# Patient Record
Sex: FEMALE | Race: BLACK OR AFRICAN AMERICAN | Employment: OTHER | URBAN - NONMETROPOLITAN AREA
[De-identification: names, ages, dates, MRNs, and addresses within clinical notes are randomized per-mention and may not be internally consistent; named-entity substitution may affect disease eponyms.]

---

## 2017-03-14 ENCOUNTER — TELEPHONE (OUTPATIENT)
Dept: PULMONOLOGY | Age: 72
End: 2017-03-14

## 2017-06-16 ENCOUNTER — TELEPHONE (OUTPATIENT)
Dept: PULMONOLOGY | Age: 72
End: 2017-06-16

## 2017-07-19 ENCOUNTER — OFFICE VISIT (OUTPATIENT)
Dept: PULMONOLOGY | Age: 72
End: 2017-07-19
Payer: COMMERCIAL

## 2017-07-19 VITALS
OXYGEN SATURATION: 97 % | BODY MASS INDEX: 36.5 KG/M2 | SYSTOLIC BLOOD PRESSURE: 124 MMHG | WEIGHT: 206 LBS | HEIGHT: 63 IN | DIASTOLIC BLOOD PRESSURE: 74 MMHG | RESPIRATION RATE: 16 BRPM | HEART RATE: 80 BPM

## 2017-07-19 DIAGNOSIS — G47.33 OSA (OBSTRUCTIVE SLEEP APNEA): Primary | ICD-10-CM

## 2017-07-19 PROCEDURE — 99213 OFFICE O/P EST LOW 20 MIN: CPT | Performed by: INTERNAL MEDICINE

## 2017-08-09 ENCOUNTER — HOSPITAL ENCOUNTER (OUTPATIENT)
Dept: AUDIOLOGY | Age: 72
Discharge: HOME | End: 2017-08-09
Payer: COMMERCIAL

## 2017-08-09 PROCEDURE — 92538 CALORIC VSTBLR TEST W/REC: CPT | Performed by: AUDIOLOGIST

## 2017-08-09 PROCEDURE — 92540 BASIC VESTIBULAR EVALUATION: CPT | Performed by: AUDIOLOGIST

## 2017-08-09 PROCEDURE — 92567 TYMPANOMETRY: CPT | Performed by: AUDIOLOGIST

## 2017-11-16 ENCOUNTER — HOSPITAL ENCOUNTER (OUTPATIENT)
Dept: WOMENS IMAGING | Age: 72
Discharge: HOME | End: 2017-11-16
Payer: COMMERCIAL

## 2017-11-16 DIAGNOSIS — Z12.31 VISIT FOR SCREENING MAMMOGRAM: ICD-10-CM

## 2017-11-16 PROCEDURE — 77063 BREAST TOMOSYNTHESIS BI: CPT

## 2018-03-07 ENCOUNTER — OFFICE VISIT (OUTPATIENT)
Dept: PULMONOLOGY | Age: 73
End: 2018-03-07
Payer: MEDICARE

## 2018-03-07 VITALS
DIASTOLIC BLOOD PRESSURE: 78 MMHG | HEART RATE: 67 BPM | WEIGHT: 210.2 LBS | HEIGHT: 67 IN | RESPIRATION RATE: 16 BRPM | SYSTOLIC BLOOD PRESSURE: 118 MMHG | BODY MASS INDEX: 32.99 KG/M2 | OXYGEN SATURATION: 97 %

## 2018-03-07 DIAGNOSIS — G47.33 OBSTRUCTIVE SLEEP APNEA: Primary | ICD-10-CM

## 2018-03-07 PROCEDURE — 99213 OFFICE O/P EST LOW 20 MIN: CPT | Performed by: PHYSICIAN ASSISTANT

## 2018-03-07 ASSESSMENT — ENCOUNTER SYMPTOMS
SHORTNESS OF BREATH: 0
SORE THROAT: 0
HEARTBURN: 0
ORTHOPNEA: 0
GASTROINTESTINAL NEGATIVE: 1
RESPIRATORY NEGATIVE: 1
COUGH: 0
SINUS PAIN: 0
NAUSEA: 0
WHEEZING: 0
SPUTUM PRODUCTION: 0
EYES NEGATIVE: 1

## 2018-04-21 ENCOUNTER — NURSE TRIAGE (OUTPATIENT)
Dept: ADMINISTRATIVE | Age: 73
End: 2018-04-21

## 2018-04-21 ENCOUNTER — HOSPITAL ENCOUNTER (EMERGENCY)
Age: 73
Discharge: HOME | End: 2018-04-21
Payer: MEDICARE

## 2018-04-21 ENCOUNTER — HOSPITAL ENCOUNTER (EMERGENCY)
Dept: GENERAL RADIOLOGY | Age: 73
Discharge: HOME | End: 2018-04-21
Payer: MEDICARE

## 2018-04-21 VITALS
DIASTOLIC BLOOD PRESSURE: 68 MMHG | OXYGEN SATURATION: 95 % | HEART RATE: 75 BPM | BODY MASS INDEX: 36.32 KG/M2 | HEIGHT: 63 IN | RESPIRATION RATE: 18 BRPM | SYSTOLIC BLOOD PRESSURE: 126 MMHG | TEMPERATURE: 99 F | WEIGHT: 205 LBS

## 2018-04-21 DIAGNOSIS — J06.9 ACUTE UPPER RESPIRATORY INFECTION: Primary | ICD-10-CM

## 2018-04-21 PROCEDURE — 99213 OFFICE O/P EST LOW 20 MIN: CPT | Performed by: NURSE PRACTITIONER

## 2018-04-21 PROCEDURE — 71046 X-RAY EXAM CHEST 2 VIEWS: CPT

## 2018-04-21 PROCEDURE — 99214 OFFICE O/P EST MOD 30 MIN: CPT

## 2018-04-21 RX ORDER — DOXYCYCLINE HYCLATE 100 MG/1
100 CAPSULE ORAL 2 TIMES DAILY
Qty: 20 CAPSULE | Refills: 0 | Status: SHIPPED | OUTPATIENT
Start: 2018-04-21 | End: 2018-05-01

## 2018-04-21 RX ORDER — BENZONATATE 200 MG/1
200 CAPSULE ORAL 3 TIMES DAILY PRN
Qty: 21 CAPSULE | Refills: 0 | Status: SHIPPED | OUTPATIENT
Start: 2018-04-21 | End: 2018-04-28

## 2018-04-21 RX ORDER — GUAIFENESIN 600 MG/1
1200 TABLET, EXTENDED RELEASE ORAL 2 TIMES DAILY
Qty: 40 TABLET | Refills: 0 | Status: SHIPPED | OUTPATIENT
Start: 2018-04-21 | End: 2018-05-01

## 2018-04-21 ASSESSMENT — ENCOUNTER SYMPTOMS
COLOR CHANGE: 0
WHEEZING: 0
COUGH: 1
VOMITING: 0
DIARRHEA: 0
RHINORRHEA: 0
SINUS PAIN: 0
SINUS PRESSURE: 1
NAUSEA: 0
TROUBLE SWALLOWING: 0
CHEST TIGHTNESS: 0
SORE THROAT: 1
SHORTNESS OF BREATH: 1

## 2018-04-27 DIAGNOSIS — J02.9 ACUTE PHARYNGITIS, UNSPECIFIED ETIOLOGY: ICD-10-CM

## 2018-04-27 DIAGNOSIS — J40 BRONCHITIS: ICD-10-CM

## 2018-09-26 NOTE — TELEPHONE ENCOUNTER
Lolis L Tyson called requesting a refill on the following medications:  Requested Prescriptions     Pending Prescriptions Disp Refills    tiotropium (SPIRIVA) 18 MCG inhalation capsule 30 capsule 3     Sig: Inhale 1 capsule into the lungs daily     Pharmacy verified:  [facility]      Date of last visit:  3/7/2018  Date of next visit (if applicable): 1/0/9394

## 2019-02-28 ENCOUNTER — HOSPITAL ENCOUNTER (OUTPATIENT)
Dept: WOMENS IMAGING | Age: 74
Discharge: HOME | End: 2019-02-28
Payer: MEDICARE

## 2019-02-28 DIAGNOSIS — Z12.31 VISIT FOR SCREENING MAMMOGRAM: ICD-10-CM

## 2019-02-28 PROCEDURE — 77063 BREAST TOMOSYNTHESIS BI: CPT

## 2019-09-12 ENCOUNTER — TELEPHONE (OUTPATIENT)
Dept: PULMONOLOGY | Age: 74
End: 2019-09-12

## 2019-09-13 NOTE — TELEPHONE ENCOUNTER
I will forward this message to Ms. Nora Hernandez. Patient is currently following with Ms. Valentina Lamb. Please see her last follow up note.

## 2019-09-16 ENCOUNTER — TELEPHONE (OUTPATIENT)
Dept: PULMONOLOGY | Age: 74
End: 2019-09-16

## 2020-01-28 NOTE — PROGRESS NOTES
Constitutional: Negative for chills and fever. Dry mouth   HENT: Negative. Negative for congestion, nosebleeds, sinus pain and sore throat. Eyes: Negative. Respiratory: Negative. Negative for cough, sputum production, shortness of breath and wheezing. Cardiovascular: Negative. Negative for chest pain, orthopnea and PND. Gastrointestinal: Negative. Negative for heartburn and nausea. Genitourinary: Negative. Musculoskeletal: Negative. Negative for joint pain and myalgias. Skin: Negative. Neurological: Negative. Negative for dizziness, weakness and headaches. Endo/Heme/Allergies: Negative. Psychiatric/Behavioral: Negative. Negative for depression. The patient is not nervous/anxious and does not have insomnia. All other systems reviewed and are negative. Physical Exam:    BMI:  Body mass index is 33.17 kg/m². Wt Readings from Last 3 Encounters:   03/07/18 210 lb 3.2 oz (95.3 kg)   07/19/17 206 lb (93.4 kg)   11/18/16 202 lb (91.6 kg)     Vitals: /78 (Site: Left Arm, Position: Sitting, Cuff Size: Large Adult)   Pulse 67   Resp 16   Ht 5' 6.75\" (1.695 m)   Wt 210 lb 3.2 oz (95.3 kg)   SpO2 97% Comment: on RA  BMI 33.17 kg/m²       Physical Exam   Constitutional: She is oriented to person, place, and time and well-developed, well-nourished, and in no distress. No distress. HENT:   Head: Normocephalic and atraumatic. Mouth/Throat: Oropharynx is clear and moist. No oropharyngeal exudate. Eyes: Pupils are equal, round, and reactive to light. Neck: Normal range of motion. Neck supple. Cardiovascular: Normal rate, regular rhythm and normal heart sounds. Pulmonary/Chest: Effort normal and breath sounds normal. No stridor. No respiratory distress. Abdominal: Soft. Bowel sounds are normal.   Musculoskeletal: Normal range of motion. She exhibits no edema. Neurological: She is alert and oriented to person, place, and time.  Gait normal.   Skin: Skin is yes

## 2020-02-12 ENCOUNTER — OFFICE VISIT (OUTPATIENT)
Dept: PULMONOLOGY | Age: 75
End: 2020-02-12
Payer: MEDICARE

## 2020-02-12 VITALS
BODY MASS INDEX: 36.89 KG/M2 | SYSTOLIC BLOOD PRESSURE: 102 MMHG | DIASTOLIC BLOOD PRESSURE: 70 MMHG | OXYGEN SATURATION: 98 % | HEIGHT: 63 IN | HEART RATE: 68 BPM | WEIGHT: 208.2 LBS

## 2020-02-12 PROCEDURE — G8427 DOCREV CUR MEDS BY ELIG CLIN: HCPCS | Performed by: PHYSICIAN ASSISTANT

## 2020-02-12 PROCEDURE — 1036F TOBACCO NON-USER: CPT | Performed by: PHYSICIAN ASSISTANT

## 2020-02-12 PROCEDURE — 99214 OFFICE O/P EST MOD 30 MIN: CPT | Performed by: PHYSICIAN ASSISTANT

## 2020-02-12 PROCEDURE — 1123F ACP DISCUSS/DSCN MKR DOCD: CPT | Performed by: PHYSICIAN ASSISTANT

## 2020-02-12 PROCEDURE — G8484 FLU IMMUNIZE NO ADMIN: HCPCS | Performed by: PHYSICIAN ASSISTANT

## 2020-02-12 PROCEDURE — G8417 CALC BMI ABV UP PARAM F/U: HCPCS | Performed by: PHYSICIAN ASSISTANT

## 2020-02-12 PROCEDURE — 1090F PRES/ABSN URINE INCON ASSESS: CPT | Performed by: PHYSICIAN ASSISTANT

## 2020-02-12 PROCEDURE — G8400 PT W/DXA NO RESULTS DOC: HCPCS | Performed by: PHYSICIAN ASSISTANT

## 2020-02-12 PROCEDURE — 4040F PNEUMOC VAC/ADMIN/RCVD: CPT | Performed by: PHYSICIAN ASSISTANT

## 2020-02-12 PROCEDURE — 3017F COLORECTAL CA SCREEN DOC REV: CPT | Performed by: PHYSICIAN ASSISTANT

## 2020-02-12 RX ORDER — HYDROCODONE BITARTRATE AND ACETAMINOPHEN 5; 325 MG/1; MG/1
TABLET ORAL
COMMUNITY
Start: 2020-01-23

## 2020-02-12 RX ORDER — TRIAMTERENE AND HYDROCHLOROTHIAZIDE 37.5; 25 MG/1; MG/1
TABLET ORAL
COMMUNITY
Start: 2020-01-19

## 2020-02-12 ASSESSMENT — ENCOUNTER SYMPTOMS
BACK PAIN: 0
STRIDOR: 0
CHEST TIGHTNESS: 0
ALLERGIC/IMMUNOLOGIC NEGATIVE: 1
SHORTNESS OF BREATH: 0
NAUSEA: 0
EYES NEGATIVE: 1
DIARRHEA: 0
WHEEZING: 0
COUGH: 0

## 2020-02-12 NOTE — PROGRESS NOTES
[Naproxen Sodium] Other (See Comments)     Upsets stomach    Lisinopril Other (See Comments)     Cough        Current Outpatient Medications   Medication Sig Dispense Refill    triamterene-hydrochlorothiazide (MAXZIDE-25) 37.5-25 MG per tablet TK 1 T PO QD IN THE MORNING      HYDROcodone-acetaminophen (NORCO) 5-325 MG per tablet TK 1 T PO  Q 8 H  PRN      albuterol-ipratropium (COMBIVENT RESPIMAT)  MCG/ACT AERS inhaler Inhale 1 puff into the lungs every 6 hours 1 Inhaler 11    UNABLE TO FIND amitiza      Ascorbic Acid (VITAMIN C PO) Take by mouth      Cetirizine HCl (ZYRTEC ALLERGY PO) Take by mouth      albuterol sulfate  (90 BASE) MCG/ACT inhaler Inhale 2 puffs into the lungs every 6 hours as needed for Wheezing 1 Inhaler 6    acetaminophen 650 MG TABS Take 650 mg by mouth every 4 hours as needed 120 tablet 3    levothyroxine (SYNTHROID) 25 MCG tablet TAKE 1 TABLET BY MOUTH DAILY 30 tablet 6    pantoprazole (PROTONIX) 40 MG tablet TAKE 1 TABLET BY MOUTH DAILY. 30 tablet 6    celecoxib (CELEBREX) 200 MG capsule Take 1 capsule by mouth 2 times daily. 60 capsule 6    Misc Natural Products (AM/PM MENOPAUSE FORMULA PO) Take by mouth      CPAP Machine MISC by Does not apply route Please change her Auto CPAP pressure to a fixed CPAP pressure of 13 cm H20. (Patient not taking: Reported on 2/12/2020) 1 each 0     No current facility-administered medications for this visit. Family History   Problem Relation Age of Onset    Diabetes Mother     High Blood Pressure Mother     High Blood Pressure Father     Diabetes Father     Stroke Sister     Diabetes Sister     High Blood Pressure Sister     Diabetes Brother     Cancer Brother         Review of Systems -   Review of Systems   Constitutional: Negative for activity change, appetite change, chills and fever. HENT: Negative for congestion and postnasal drip. Eyes: Negative.     Respiratory: Negative for cough, chest tightness, 30-39.9)     3. Hypersomnia              Plan   Do you need any equipment today? Yes update supplies  - She needs to get new supplies so she can start wearing it again  - she needs to to improve compliance to improve hypersomnia  - She  was advised to continue current positive airway pressure therapy with above described pressure. - She  advised to keep good compliance with current recommended pressure to get optimal results and clinical improvement  - Recommend 7-9 hours of sleep with PAP  - She was advised to call [employer] company regarding supplies if needed.   -She call my office for earlier appointment if needed for worsening of sleep symptoms.   - She was instructed on weight loss  - Sara Zurita was educated about my impression and plan. Patient verbalizesunderstanding.   We will see Fifi Macdonald back in: 6 months with download    Information added by my medical assistant/LPN was reviewed today         Álvaro Ware PA-C, MPAS  2/12/2020

## 2020-02-13 ENCOUNTER — TELEPHONE (OUTPATIENT)
Dept: PULMONOLOGY | Age: 75
End: 2020-02-13

## 2020-03-02 ENCOUNTER — HOSPITAL ENCOUNTER (OUTPATIENT)
Dept: WOMENS IMAGING | Age: 75
Discharge: HOME | End: 2020-03-02
Payer: MEDICARE

## 2020-03-02 PROCEDURE — 77067 SCR MAMMO BI INCL CAD: CPT

## 2020-03-04 ENCOUNTER — TELEPHONE (OUTPATIENT)
Dept: PULMONOLOGY | Age: 75
End: 2020-03-04

## 2020-09-17 ENCOUNTER — OFFICE VISIT (OUTPATIENT)
Dept: PULMONOLOGY | Age: 75
End: 2020-09-17
Payer: MEDICARE

## 2020-09-17 VITALS
DIASTOLIC BLOOD PRESSURE: 88 MMHG | HEART RATE: 74 BPM | WEIGHT: 215 LBS | OXYGEN SATURATION: 99 % | HEIGHT: 63 IN | SYSTOLIC BLOOD PRESSURE: 132 MMHG | TEMPERATURE: 98.9 F | BODY MASS INDEX: 38.09 KG/M2

## 2020-09-17 PROCEDURE — 1123F ACP DISCUSS/DSCN MKR DOCD: CPT | Performed by: NURSE PRACTITIONER

## 2020-09-17 PROCEDURE — 1090F PRES/ABSN URINE INCON ASSESS: CPT | Performed by: NURSE PRACTITIONER

## 2020-09-17 PROCEDURE — 99213 OFFICE O/P EST LOW 20 MIN: CPT | Performed by: NURSE PRACTITIONER

## 2020-09-17 PROCEDURE — 1036F TOBACCO NON-USER: CPT | Performed by: NURSE PRACTITIONER

## 2020-09-17 PROCEDURE — G8427 DOCREV CUR MEDS BY ELIG CLIN: HCPCS | Performed by: NURSE PRACTITIONER

## 2020-09-17 PROCEDURE — G8400 PT W/DXA NO RESULTS DOC: HCPCS | Performed by: NURSE PRACTITIONER

## 2020-09-17 PROCEDURE — 4040F PNEUMOC VAC/ADMIN/RCVD: CPT | Performed by: NURSE PRACTITIONER

## 2020-09-17 PROCEDURE — 3017F COLORECTAL CA SCREEN DOC REV: CPT | Performed by: NURSE PRACTITIONER

## 2020-09-17 PROCEDURE — G8417 CALC BMI ABV UP PARAM F/U: HCPCS | Performed by: NURSE PRACTITIONER

## 2020-09-17 ASSESSMENT — ENCOUNTER SYMPTOMS
COUGH: 0
WHEEZING: 0
STRIDOR: 0
DIARRHEA: 0
VOMITING: 0
CHEST TIGHTNESS: 0
NAUSEA: 0
SHORTNESS OF BREATH: 0

## 2020-09-17 NOTE — PROGRESS NOTES
HYSTERECTOMY      partial    JOINT REPLACEMENT      Lt total knee    VOCAL CORD AUGMENTATION W/RADIESSE INJ  3-       Social History     Tobacco Use    Smoking status: Former Smoker     Packs/day: 0.75     Years: 30.00     Pack years: 22.50     Types: Cigarettes     Start date:      Last attempt to quit: 10/31/2010     Years since quittin.8    Smokeless tobacco: Never Used   Substance Use Topics    Alcohol use: Yes     Comment: social    Drug use: No       Allergies   Allergen Reactions    Aleve [Naproxen Sodium] Other (See Comments)     Upsets stomach    Lisinopril Other (See Comments)     Cough        Current Outpatient Medications   Medication Sig Dispense Refill    HYDROcodone-acetaminophen (NORCO) 5-325 MG per tablet TK 1 T PO  Q 8 H  PRN      UNABLE TO FIND amitiza      Misc Natural Products (AM/PM MENOPAUSE FORMULA PO) Take by mouth      Ascorbic Acid (VITAMIN C PO) Take by mouth      Cetirizine HCl (ZYRTEC ALLERGY PO) Take by mouth      acetaminophen 650 MG TABS Take 650 mg by mouth every 4 hours as needed 120 tablet 3    levothyroxine (SYNTHROID) 25 MCG tablet TAKE 1 TABLET BY MOUTH DAILY 30 tablet 6    pantoprazole (PROTONIX) 40 MG tablet TAKE 1 TABLET BY MOUTH DAILY. 30 tablet 6    triamterene-hydrochlorothiazide (MAXZIDE-25) 37.5-25 MG per tablet TK 1 T PO QD IN THE MORNING       No current facility-administered medications for this visit. Family History   Problem Relation Age of Onset    Diabetes Mother     High Blood Pressure Mother     High Blood Pressure Father     Diabetes Father     Stroke Sister     Diabetes Sister     High Blood Pressure Sister     Diabetes Brother     Cancer Brother         Review of Systems -   Review of Systems   Constitutional: Negative for chills, fever and unexpected weight change. Respiratory: Negative for cough, chest tightness, shortness of breath, wheezing and stridor.     Cardiovascular: Negative for chest pain and leg swelling. Gastrointestinal: Negative for diarrhea, nausea and vomiting. Genitourinary: Negative for dysuria. Physical Exam:    BMI:  Body mass index is 38.09 kg/m². Wt Readings from Last 3 Encounters:   09/17/20 215 lb (97.5 kg)   02/12/20 208 lb 3.2 oz (94.4 kg)   04/21/18 205 lb (93 kg)     Weight stable / unchanged  Vitals: /88 (Site: Left Upper Arm, Position: Sitting, Cuff Size: Large Adult)   Pulse 74   Temp 98.9 °F (37.2 °C) (Temporal)   Ht 5' 3\" (1.6 m)   Wt 215 lb (97.5 kg)   SpO2 99% Comment: r/a  BMI 38.09 kg/m²       Physical Exam  Vitals signs and nursing note reviewed. Constitutional:       General: She is not in acute distress. Appearance: She is well-developed. She is obese. Comments: BMI 38   HENT:      Head: Normocephalic and atraumatic. Neck:      Musculoskeletal: Neck supple. Trachea: No tracheal deviation. Cardiovascular:      Rate and Rhythm: Normal rate and regular rhythm. Heart sounds: Normal heart sounds. No murmur. Pulmonary:      Effort: Pulmonary effort is normal. No respiratory distress. Breath sounds: Normal breath sounds. No stridor. No wheezing or rales. Chest:      Chest wall: No tenderness. Abdominal:      General: Bowel sounds are normal.      Palpations: Abdomen is soft. Skin:     General: Skin is warm and dry. Capillary Refill: Capillary refill takes less than 2 seconds. Neurological:      Mental Status: She is alert and oriented to person, place, and time. Psychiatric:         Behavior: Behavior normal.         Thought Content: Thought content normal.           ASSESSMENT/DIAGNOSIS     Diagnosis Orders   1. Obstructive sleep apnea  DME Order for CPAP as OP   2. Obesity (BMI 30-39.9)     3. Hypersomnia              Plan   Do you need any equipment today? Yes new Mask       - She  was advised to resume current positive airway pressure therapy with previous pressure.  As soon as mask is available  - She  advised to keep good compliance with current recommended pressure to get optimal results and clinical improvement  - Recommend 7-9 hours of sleep with PAP  - She was advised to call [employer] company regarding supplies if needed.   -She is to call my office for earlier appointment if needed for worsening of sleep symptoms.   - She was instructed on weight loss  - was educated about my impression and plan. Patient verbalizes understanding.    -Patient was inquiring about having her COPD managed through our office will need separate appointment for new Pulmonary consult with Dr. Sony Vance saw patient at [facility] several years ago  -Will request records from [facility] for any Pulmonary testing ie. spirometry.     We will see Rose Mary Walkerks back in: 1 ,year with download     Information added by my medical assistant/LPN was reviewed today     Electronically signed by ROSELIA Agustin CNP on 9/17/20 at 9:46 AM EDT

## 2020-10-04 NOTE — PROGRESS NOTES
[facility] [address]  Pulmonary medicine clinic initial consult note Vish Rodriguez is an established patient of my sleep medicine clinic at [facility]. She came for Pulmonary medicine evaluation today. Patient: Wil Fagan  :       Chief complaint/[place]: Wil Fagan is a 77 y.o. old female came for further evaluation regarding her with referral from***. Social History:  Occupation:  She is current working: {YES/NO:}  Type of profession: {Occupation:12970}. History of tobacco smoking:{YES/NO:}. Amount of tobacco smoking: {NUMBERS; 0.25-3 BY 0.25:40081} PPD. Years of tobacco smoking: {Numbers; 0-100:00847}. Quit smoking: {YES/NO:}. .              Quit year:{YES/NO:}. Current smoker: {YES/NO:}. Amount of current tobacco smoking: {NUMBERS; 0.25-3 BY 2.34:51167} PPD    History of recreational or IV drug use in the past:NO     History of exposure to coal mines/coal dust: NO  History of exposure to foundry dust/welding: NO  History of exposure to quarry/silica/sandblasting: NO  History of exposure to asbestos/working with breaks/ships: NO  History of exposure to farm dust: NO  History of recent travel to long distances: NO  History of exposure to birds, pigeons, or chickens in the past:NO  Pet animals at home:{YES/NO:}  Dogs: {NUMBERS 0-10:48785}  Cats: {NUMBERS 0-10:}    History of pulmonary embolism in the past: {YES/NO:}            History of DVT in the past:{YES/NO:}                             Review of Systems:   General/Constitutional: No recent loss of weight or appetite changes. No fever or chills. HENT: Negative. Eyes: Negative. Upper respiratory tract: No nasal stuffiness or post nasal drip. Lower respiratory tract/ lungs: No cough or sputum production. No hemoptysis.   Cardiovascular: No palpitations or chest  High Blood Pressure Father     Diabetes Father     Stroke Sister     Diabetes Sister     High Blood Pressure Sister     Diabetes Brother     Cancer Brother            Physical Exam:    VITALS:  There were no vitals taken for this visit. Nursing note and vitals reviewed. Constitutional: Patient appears moderately built and moderately nourished. No distress. Patient is oriented to person, place, and time. HENT:   Head: Normocephalic and atraumatic. Right Ear: External ear normal.   Left Ear: External ear normal.   Mouth/Throat: Oropharynx is clear and moist.  No oral thrush. Eyes: Conjunctivae are normal. Pupils are equal, round, and reactive to light. No scleral icterus. Neck: Neck supple. No JVD present. No tracheal deviation present. Cardiovascular: Normal rate, regular rhythm, normal heart sounds. No murmur heard. Pulmonary/Chest: Effort normal and breath sounds normal. No stridor. No respiratory distress. No wheezes. No rales. Patient exhibits no tenderness. Abdominal: Soft. Patient exhibits no distension. No tenderness. Musculoskeletal: Normal range of motion. Extremities: Patient exhibits no edema and no tenderness. Lymphadenopathy:  No cervical adenopathy. Neurological: Patient is alert and oriented to person, place, and time. Skin: Skin is warm and dry. Patient is not diaphoretic. Psychiatric: Patient  has a normal mood and affect. Patient behavior is normal.     Mallampati airway Class:{Ethan # I-V:30229}  Neck Circumference:{NUMBERS 0-31:95172}. {NUMBERS; 0-9:18627} Inches    Diagnostic Data:    Radiological Data:      Pulmonary function tests:        Assessment:  - Mild obstructive sleep apnea on treatment with Auto CPAP Level  Min Pressure 6cmH2O and maximum pressure of  20 cmH2O- under good control with current therapy with significant improvement in clinical symptoms.  - Inadequate sleep hygiene-improved. Periodic limb movements with no significant arousals.   -

## 2020-10-27 ENCOUNTER — OFFICE VISIT (OUTPATIENT)
Dept: PULMONOLOGY | Age: 75
End: 2020-10-27
Payer: MEDICARE

## 2020-10-27 ENCOUNTER — HOSPITAL ENCOUNTER (OUTPATIENT)
Dept: GENERAL RADIOLOGY | Age: 75
Discharge: HOME | End: 2020-10-27
Payer: MEDICARE

## 2020-10-27 VITALS
WEIGHT: 207.8 LBS | DIASTOLIC BLOOD PRESSURE: 88 MMHG | TEMPERATURE: 98.7 F | SYSTOLIC BLOOD PRESSURE: 136 MMHG | HEIGHT: 63 IN | HEART RATE: 93 BPM | BODY MASS INDEX: 36.82 KG/M2 | OXYGEN SATURATION: 98 %

## 2020-10-27 PROBLEM — R06.02 SOB (SHORTNESS OF BREATH) ON EXERTION: Status: ACTIVE | Noted: 2020-10-27

## 2020-10-27 PROBLEM — Z87.09 HISTORY OF COPD: Status: ACTIVE | Noted: 2020-10-27

## 2020-10-27 PROBLEM — R06.09 DYSPNEA ON EXERTION: Status: ACTIVE | Noted: 2020-10-27

## 2020-10-27 PROCEDURE — G8417 CALC BMI ABV UP PARAM F/U: HCPCS | Performed by: INTERNAL MEDICINE

## 2020-10-27 PROCEDURE — 3209999900 XR COMPARISON OF OUTSIDE FILMS

## 2020-10-27 PROCEDURE — G8400 PT W/DXA NO RESULTS DOC: HCPCS | Performed by: INTERNAL MEDICINE

## 2020-10-27 PROCEDURE — 1123F ACP DISCUSS/DSCN MKR DOCD: CPT | Performed by: INTERNAL MEDICINE

## 2020-10-27 PROCEDURE — G8427 DOCREV CUR MEDS BY ELIG CLIN: HCPCS | Performed by: INTERNAL MEDICINE

## 2020-10-27 PROCEDURE — 1036F TOBACCO NON-USER: CPT | Performed by: INTERNAL MEDICINE

## 2020-10-27 PROCEDURE — 4040F PNEUMOC VAC/ADMIN/RCVD: CPT | Performed by: INTERNAL MEDICINE

## 2020-10-27 PROCEDURE — 99214 OFFICE O/P EST MOD 30 MIN: CPT | Performed by: INTERNAL MEDICINE

## 2020-10-27 PROCEDURE — 3017F COLORECTAL CA SCREEN DOC REV: CPT | Performed by: INTERNAL MEDICINE

## 2020-10-27 PROCEDURE — 1090F PRES/ABSN URINE INCON ASSESS: CPT | Performed by: INTERNAL MEDICINE

## 2020-10-27 PROCEDURE — G8484 FLU IMMUNIZE NO ADMIN: HCPCS | Performed by: INTERNAL MEDICINE

## 2020-10-27 RX ORDER — LUBIPROSTONE 24 UG/1
CAPSULE, GELATIN COATED ORAL
COMMUNITY
Start: 2020-09-24

## 2020-10-27 RX ORDER — FLUTICASONE PROPIONATE 50 MCG
1 SPRAY, SUSPENSION (ML) NASAL DAILY
Qty: 1 BOTTLE | Refills: 3 | Status: SHIPPED | OUTPATIENT
Start: 2020-10-27

## 2020-10-27 RX ORDER — LOSARTAN POTASSIUM 25 MG/1
TABLET ORAL
COMMUNITY
Start: 2020-09-09 | End: 2020-11-16 | Stop reason: ALTCHOICE

## 2020-10-27 NOTE — PROGRESS NOTES
[facility]  Pulmonary medicine clinic initial consult note. Patient: Lina Calles  :       Chief complaint/[place]: Lina Calles is a 77 y.o. old female came for further evaluation regarding her COPD and shortness of breath. This patient is established at our sleep clinic for ORESTES on CPAP, presents to the office for continued management of her COPD previously seen at [facility]. Endorses not using the CPAP because the mouth piece burns the nose, also d/t cough. Patient reports something is stuck in throat, feeling as if she can't clear her throat entirely. Per pt, barium swallow in 2020, no findings. Proventil no longer helpful. She is having shortness of breath: Yes exertional  Onset: gradual   Duration:6months. Diurnal variation:  None. Functional status prior to beginning of symptoms: 2 block/s on level ground. - also knee pain  Current functional capacity on level ground: became SOB walking from parking lot to office  She can climb steps: Yes  Flights of steps she can climb: 2  She denies orthopnea. She denies paroxysmal nocturnal dyspnea. She is having cough: Yes  Duration of cough: for 1 month changed BP med at that time. Her cough is associated with sputum production: Yes   The sputum color: clear, occasionally green   Hemoptysis:No  Diurnal variation: None. Relieving factors: Yes - cough syrup  Aggravating factors: Yes - talking    She is having chest pain:No    She is currently not using any oxygen supplementation at rest, exercise or during sleep/at night time. She was never diagnosed with pulmonary diseases I.e bronchial asthma, COPD,Pulmonary fibrosis, Sarcoidosis, pulmonary embolism,pulmonary hypertension or pleural effusion/s in the past.  She denies any hemoptysis. She was never diagnosed with pulmonary tuberculosis in the past. She denies any recent exposure to any patients with tuberculosis.  She denies any recent travel to endemic places of tuberculosis. She  denies to TB exposure in the past.  She denies any history of exposure to patients with tuberculosis in the past. She denies any recent travel history to endemic places to tuberculosis. Social History:  Occupation:  She is current working: No  Type of profession: retired. - [employer]                        History of tobacco smoking:Yes  Amount of tobacco smokin.0 PPD. Years of tobacco smokin. Quit smoking: Yes. Quit OEDE:6851   . History of recreational or IV drug use in the past:NO     History of exposure to coal mines/coal dust: NO  History of exposure to foundry dust/welding: NO  History of exposure to quarry/silica/sandblasting: NO  History of exposure to asbestos/working with breaks/ships: NO  History of exposure to farm dust: NO  History of recent travel to long distances: NO  History of exposure to birds, pigeons, or chickens in the past:NO  Pet animals at home:No  Had a dog before. History of pulmonary embolism in the past: No            History of DVT in the past:No                             Review of Systems:   General/Constitutional: No recent loss of weight or appetite changes. No fever or chills. HENT: Negative. Eyes: Negative. Upper respiratory tract: No nasal stuffiness or post nasal drip. Lower respiratory tract/ lungs: No cough or sputum production. No hemoptysis. Cardiovascular: No palpitations or chest pain. Gastrointestinal: No nausea or vomiting. Neurological: No focal neurologiacal weakness. Extremities: No edema. Musculoskeletal: No complaints. Genitourinary: No complaints. Hematological: Negative. Psychiatric/Behavioral: Negative. Skin: No itching.       Current Medications:          Past Medical History:   Diagnosis Date    COPD (chronic obstructive pulmonary disease) (HCC)     DJD (degenerative joint disease)     GERD (gastroesophageal reflux disease) the chest.        Pulmonary function tests:  [facility] 3-4 years ago        Assessment:  -Hx of COPD of uncertain severity. - still waiting for PFT records, their system is currently down  -Mild obstructive sleep apnea on treatment with auto CPAP level minimum pressure 6 cmH2O and maximum pressure 20 cmH2O under good control with current therapy with significant improvement in clinical symptoms.  -Inadequate sleep hygiene -improved  Periodic limb movements with no significant arousals.  -Hypertension  -Obesity  -WELL's score = 3 / Unlikely    Recommendations/Plan:  Office follow up in 1 month  -Low dose CT screening  -PFT scheduled  - Referral to Dr Ti Zurita for globus pharyngeus evaluation    Addendum by Dr. Flori Gambino MD:  I have seen and examined the patient independently. Face to face evaluation and examination was performed. The above evaluation and note has been reviewed. Labs and radiographs were reviewed. I have discussed with Dr.Vitaliy Jie MD about this patient in detail. The above assessment and plan has been reviewed. Please see my modifications mentioned below. My modifications:  She is an established patient of my sleep medicine clinic at [facility]. She came for Pulmonary medicine evaluation today. Her old pulmonary function test from [facility] was not available for review. Will follow patient in 1 month with a low-dose CT scan of chest along with a full pulmonary function test to evaluate for COPD. She is currently following with the [facility]. She was seen by Mr. Kaleigh Gupta CNP on 17 September 2020 for the last time.   Patient educated about my impression and plan      Governor Kirstie MD 10/27/2020 7:15 PM

## 2020-10-27 NOTE — PATIENT INSTRUCTIONS
- follow up in 1 month after pulmonary function test  - COVID testing before pulmonary function test  - please get CT chest before next visit

## 2020-10-27 NOTE — PROGRESS NOTES
Neck Circumference -   16.5  Mallampati - 4    Lung Nodule Screening     [] Qualifies    [] Does not qualify   [] Declined    [] Completed

## 2020-11-03 ENCOUNTER — OFFICE VISIT (OUTPATIENT)
Dept: ENT CLINIC | Age: 75
End: 2020-11-03
Payer: MEDICARE

## 2020-11-03 VITALS
WEIGHT: 208.8 LBS | HEIGHT: 63 IN | HEART RATE: 80 BPM | BODY MASS INDEX: 37 KG/M2 | DIASTOLIC BLOOD PRESSURE: 70 MMHG | RESPIRATION RATE: 14 BRPM | TEMPERATURE: 98.1 F | SYSTOLIC BLOOD PRESSURE: 118 MMHG

## 2020-11-03 PROCEDURE — 4040F PNEUMOC VAC/ADMIN/RCVD: CPT | Performed by: OTOLARYNGOLOGY

## 2020-11-03 PROCEDURE — G8400 PT W/DXA NO RESULTS DOC: HCPCS | Performed by: OTOLARYNGOLOGY

## 2020-11-03 PROCEDURE — 99203 OFFICE O/P NEW LOW 30 MIN: CPT | Performed by: OTOLARYNGOLOGY

## 2020-11-03 PROCEDURE — 1036F TOBACCO NON-USER: CPT | Performed by: OTOLARYNGOLOGY

## 2020-11-03 PROCEDURE — 1090F PRES/ABSN URINE INCON ASSESS: CPT | Performed by: OTOLARYNGOLOGY

## 2020-11-03 PROCEDURE — G8484 FLU IMMUNIZE NO ADMIN: HCPCS | Performed by: OTOLARYNGOLOGY

## 2020-11-03 PROCEDURE — G8417 CALC BMI ABV UP PARAM F/U: HCPCS | Performed by: OTOLARYNGOLOGY

## 2020-11-03 PROCEDURE — 1123F ACP DISCUSS/DSCN MKR DOCD: CPT | Performed by: OTOLARYNGOLOGY

## 2020-11-03 PROCEDURE — 31575 DIAGNOSTIC LARYNGOSCOPY: CPT | Performed by: OTOLARYNGOLOGY

## 2020-11-03 PROCEDURE — G8427 DOCREV CUR MEDS BY ELIG CLIN: HCPCS | Performed by: OTOLARYNGOLOGY

## 2020-11-03 PROCEDURE — 3017F COLORECTAL CA SCREEN DOC REV: CPT | Performed by: OTOLARYNGOLOGY

## 2020-11-03 RX ORDER — AMLODIPINE BESYLATE 2.5 MG/1
2.5 TABLET ORAL DAILY
COMMUNITY

## 2020-11-03 ASSESSMENT — ENCOUNTER SYMPTOMS
WHEEZING: 0
ABDOMINAL PAIN: 0
APNEA: 0
COUGH: 0
CHEST TIGHTNESS: 0
COLOR CHANGE: 0
FACIAL SWELLING: 0
RHINORRHEA: 0
SHORTNESS OF BREATH: 0
TROUBLE SWALLOWING: 0
SORE THROAT: 0
NAUSEA: 0
VOICE CHANGE: 0
VOMITING: 0
SINUS PRESSURE: 0
STRIDOR: 0
CHOKING: 0
DIARRHEA: 0

## 2020-11-03 NOTE — PROGRESS NOTES
[facility] PROFESSIONAL SERVS  [facility]  [facility] [place]  Dept: [phone]  Dept Fax: [phone]  Loc: [phone]    James Diaz is a 77 y.o. female who was referred Gwen Cooper MD for:  Chief Complaint   Patient presents with    Other     New patient is here for globus pharyngeus ref by Dr. Shiva Garnica. c/o shotting pain ear pain, dizziness. C/o cough and feeling of foriegn body in the throat    . HPI:     James Diaz is a 77 y.o. female who presents today for problems mainly with her throat. She has a history of coughing from lisinopril a long time ago and in September she was prescribed losartan. She immediately began having problems with coughing and foreign body sensation in the throat. She has been taken off the losartan and placed on amlodipine. She is still coughing and clearing her throat. She is on Protonix for acid reflux and has a nice snack every night before bed. Her head of bed is not elevated. She has had some ear discomfort but that is improved. Pedro Ross History:      Allergies   Allergen Reactions    Aleve [Naproxen Sodium] Other (See Comments)     Upsets stomach    Lisinopril Other (See Comments)     Cough      Current Outpatient Medications   Medication Sig Dispense Refill    amLODIPine (NORVASC) 2.5 MG tablet Take 2.5 mg by mouth daily      losartan (COZAAR) 25 MG tablet TK 1/2 T PO D      AMITIZA 24 MCG capsule TK 1 C PO BID WF      fluticasone (FLONASE) 50 MCG/ACT nasal spray 1 spray by Each Nostril route daily 1 Bottle 3    triamterene-hydrochlorothiazide (MAXZIDE-25) 37.5-25 MG per tablet TK 1 T PO QD IN THE MORNING      HYDROcodone-acetaminophen (NORCO) 5-325 MG per tablet TK 1 T PO  Q 8 H  PRN      UNABLE TO FIND amitiza      Misc Natural Products (AM/PM MENOPAUSE FORMULA PO) Take by mouth      Ascorbic Acid (VITAMIN C PO) Take by mouth      Cetirizine HCl (ZYRTEC ALLERGY PO) Take by mouth      acetaminophen 650 MG TABS Take 650 mg by mouth every 4 hours as needed 120 tablet 3    levothyroxine (SYNTHROID) 25 MCG tablet TAKE 1 TABLET BY MOUTH DAILY 30 tablet 6    pantoprazole (PROTONIX) 40 MG tablet TAKE 1 TABLET BY MOUTH DAILY. 30 tablet 6     No current facility-administered medications for this visit. Past Medical History:   Diagnosis Date    COPD (chronic obstructive pulmonary disease) (HCC)     DJD (degenerative joint disease)     GERD (gastroesophageal reflux disease)     Hypertension     IBS (irritable bowel syndrome)     Insomnia     Osteoarthritis     Pap smear, as part of routine gynecological examination 3-2011    PPD positive 7/15/2015    Screening mammogram 3-2011    Sleep apnea       Past Surgical History:   Procedure Laterality Date    HEMORRHOID SURGERY      HYSTERECTOMY      partial    JOINT REPLACEMENT      Lt total knee    VOCAL CORD AUGMENTATION W/RADIESSE INJ  3-2011     Family History   Problem Relation Age of Onset    Diabetes Mother     High Blood Pressure Mother     High Blood Pressure Father     Diabetes Father     Stroke Sister     Diabetes Sister     High Blood Pressure Sister     Diabetes Brother     Cancer Brother      Social History     Tobacco Use    Smoking status: Former Smoker     Packs/day: 0.75     Years: 30.00     Pack years: 22.50     Types: Cigarettes     Start date: 1980     Last attempt to quit: 10/31/2010     Years since quitting: 10.0    Smokeless tobacco: Never Used   Substance Use Topics    Alcohol use: Yes     Comment: social       Subjective:      Review of Systems   Constitutional: Negative for activity change, appetite change, chills, diaphoresis, fatigue, fever and unexpected weight change. HENT: Positive for ear pain.  Negative for congestion, dental problem, ear discharge, facial swelling, hearing loss, mouth sores, nosebleeds, postnasal drip, rhinorrhea, sinus pressure, sneezing, sore throat, tinnitus, trouble swallowing and voice change. Eyes: Negative for visual disturbance. Respiratory: Negative for apnea, cough, choking, chest tightness, shortness of breath, wheezing and stridor. Cardiovascular: Negative for chest pain, palpitations and leg swelling. Gastrointestinal: Negative for abdominal pain, diarrhea, nausea and vomiting. Endocrine: Negative for cold intolerance, heat intolerance, polydipsia and polyuria. Genitourinary: Negative for dysuria, enuresis and hematuria. Musculoskeletal: Negative for arthralgias, gait problem, neck pain and neck stiffness. Skin: Negative for color change and rash. Allergic/Immunologic: Negative for environmental allergies, food allergies and immunocompromised state. Neurological: Negative for dizziness, syncope, facial asymmetry, speech difficulty, light-headedness and headaches. Hematological: Negative for adenopathy. Does not bruise/bleed easily. Psychiatric/Behavioral: Negative for confusion and sleep disturbance. The patient is not nervous/anxious. Objective:   /70 (Site: Left Upper Arm, Position: Sitting)   Pulse 80   Temp 98.1 °F (36.7 °C) (Infrared)   Resp 14   Ht 5' 3\" (1.6 m)   Wt 208 lb 12.8 oz (94.7 kg)   BMI 36.99 kg/m²     Physical Exam  Vitals signs and nursing note reviewed. Constitutional:       Appearance: She is well-developed. HENT:      Head: Normocephalic and atraumatic. No laceration. Comments:        Right Ear: Hearing, ear canal and external ear normal. No drainage or swelling. No middle ear effusion. Tympanic membrane is not perforated or erythematous. Left Ear: Hearing, tympanic membrane, ear canal and external ear normal. No drainage or swelling. No middle ear effusion. Tympanic membrane is not perforated or erythematous. Nose: Nose normal. No septal deviation, mucosal edema or rhinorrhea. Mouth/Throat:      Mouth: Mucous membranes are not pale and not dry. No oral lesions. Pharynx: Oropharynx is clear. Uvula midline. No oropharyngeal exudate or posterior oropharyngeal erythema. Comments: LIps: lips normal     Mallampati 1  Base of tongue: symmetric,  Eyes:      Extraocular Movements:      Right eye: Normal extraocular motion and no nystagmus. Left eye: Normal extraocular motion and no nystagmus. Comments: Conjugate gaze   Neck:      Musculoskeletal: Neck supple. Thyroid: No thyroid mass or thyromegaly. Trachea: Phonation normal. No tracheal deviation. Comments:   Salivary glands not enlarged and normal to palpation    Pulmonary:      Effort: Pulmonary effort is normal. No retractions. Breath sounds: No stridor. Neurological:      Mental Status: She is alert and oriented to person, place, and time. Cranial Nerves: No cranial nerve deficit (VIIth N function intact bilat). Psychiatric:         Mood and Affect: Mood and affect normal.         Behavior: Behavior is cooperative. She was frequently clearing her throat during the visit. PROCEDURE: FIBEROPTIC LARYNGOSCOPY    A fiberoptic laryngoscopy was performed under topical anesthesia, after using Afrin and Lidocaine spray in the nasal fossa. The nasal fossa, nasopharynx, hypopharynx and larynx were carefully examined. Base of tongue was symmetrical. Epiglottis appeared normal and was not retrodisplaced. True vocal cords had normal mobility. There was a soft watery edema of the postcricoid area. There was erythema at the vocal processes consistent with throat-clearing erythema of the arytenoids and laryngeal surface of the epiglottis consistent with GERD. No mucosal lesions or masses were noted. No pooling in the pyriform sinuses. Data:  All of the past medical history, past surgical history, family history,social history, allergies and current medications were reviewed with the patient. Assessment & Plan   Diagnoses and all orders for this visit:     Diagnosis Orders   1.  GERD without esophagitis  PA LARYNGOSCOPY FLEXIBLE DIAGNOSTIC   2. Adverse effect of angiotensin 2 receptor antagonist, initial encounter  PA LARYNGOSCOPY FLEXIBLE DIAGNOSTIC    Already stopped   3. Chronic throat clearing  PA LARYNGOSCOPY FLEXIBLE DIAGNOSTIC   4. Sensation of foreign body in larynx  PA LARYNGOSCOPY FLEXIBLE DIAGNOSTIC       The findings were explained and her questions were answered. Post cricoid edema and arytenoid erythema plus erythema of laryngeal surface epiglottis is consistent with adverse effects of ACE/ARB and GERD. Symptoms in the larynx from angiotensin drugs can last up to 2 months. Patient has findings consistent with some residual of the ARB edema of the postcricoid area of the larynx, and evidence of uncontrolled acid reflux. She is clearing her throat every time she coughs as well, and I trained her how to cough without phonation. Totally agree with having stopped the losartan and with time that should subside, but she has to stop clearing her throat to get symptomatic improvement. She has been vigorously clearing her throat every time she \"coughs, and she also frequently intentionally clears her throat without coughing. When stopping losartan and following the instructions below, she should be much better on her return visit. Patient instructions:  Do not fill up stomach for 3 hours before bedtime. Elevate the head of the bed, perhaps with a foam wedge. May take Protonix with supper. Stop clearing your throat. Cough instead, take a drink of water, and take Mucinex DM for at least a couple weeks        Return in about 6 weeks (around 12/15/2020) for GERD, ACE/ARB. Shayy Hollingsworth. Areli Hooks MD    **This report has been created using voice recognition software. It may contain minor errors which are inherent in voicerecognition technology. **

## 2020-11-03 NOTE — LETTER
[address] and [address]  Phone: [phone]  Fax: [phone]    Vicente Robert MD        November 15, 2020    Patient: Clarke Gomez   MR Number: [id number]   YOB: 1945   Date of Visit: 11/3/2020     Dear  [facility],    Thank you for the request for consultation for Clarke Gomez to me for the evaluation of foreign body sensation in her throat. She has what appears to be some residual laryngeal edema from the losartan she was taking, and uncontrolled GERD. Below are the relevant portions of my assessment and plan of care. Assessment & Plan   Diagnoses and all orders for this visit:     Diagnosis Orders   1. GERD without esophagitis  WA LARYNGOSCOPY FLEXIBLE DIAGNOSTIC   2. Adverse effect of angiotensin 2 receptor antagonist, initial encounter  WA LARYNGOSCOPY FLEXIBLE DIAGNOSTIC    Already stopped   3. Chronic throat clearing  WA LARYNGOSCOPY FLEXIBLE DIAGNOSTIC   4. Sensation of foreign body in larynx  WA LARYNGOSCOPY FLEXIBLE DIAGNOSTIC       The findings were explained and her questions were answered. Post cricoid edema and arytenoid erythema plus erythema of laryngeal surface epiglottis is consistent with adverse effects of ACE/ARB and GERD. Symptoms in the larynx from angiotensin drugs can last up to 2 months. Patient has findings consistent with some residual of the ARB edema of the postcricoid area of the larynx, and evidence of uncontrolled acid reflux. She is clearing her throat every time she coughs as well, and I trained her how to cough without phonation. Totally agree with having stopped the losartan and with time that should subside, but she has to stop clearing her throat to get symptomatic improvement.   She has been vigorously clearing her throat every time she \"coughs, and she also frequently intentionally clears her throat without coughing. When stopping losartan and following the instructions below, she should be much better on her return visit. Patient instructions:  Do not fill up stomach for 3 hours before bedtime. Elevate the head of the bed, perhaps with a foam wedge. May take Protonix with supper. Stop clearing your throat. Cough instead, take a drink of water, and take Mucinex DM for at least a couple weeks    Return in about 6 weeks (around 12/15/2020) for GERD, ACE/ARB. If you have questions, please do not hesitate to call me. I look forward to following [place] along with you.     Sincerely,          Ana Armando MD

## 2020-11-03 NOTE — PATIENT INSTRUCTIONS
Do not fill up stomach for 3 hours before bedtime. Elevate the head of the bed, perhaps with a foam wedge. May take Protonix with supper. Stop clearing your throat.   Cough instead, take a drink of water, and take Mucinex DM for at least a couple weeks

## 2020-11-04 ENCOUNTER — TELEPHONE (OUTPATIENT)
Dept: PULMONOLOGY | Age: 75
End: 2020-11-04

## 2020-11-04 NOTE — TELEPHONE ENCOUNTER
She needs follow-up with my sleep clinic at [address] [facility], [address] for pulmonary disease in next 1 to 2 weeks with the latest download from her auto CPAP machine to further adjust her CPAP pressure settings.

## 2020-11-04 NOTE — TELEPHONE ENCOUNTER
You saw this patient on 10/27 and she states that you told her that you were going to change her pressure on her CPAP to 9 but there is no order.  Fax to [facility]

## 2020-11-04 NOTE — TELEPHONE ENCOUNTER
Called patient back and she told me that Dr Siobhan Dasilva told her to have her humidity turned down to 9 not her pressure. I advised her to call [facility] and they can tell her how to adjust it.

## 2020-11-17 ENCOUNTER — TELEPHONE (OUTPATIENT)
Dept: PULMONOLOGY | Age: 75
End: 2020-11-17

## 2020-11-17 NOTE — TELEPHONE ENCOUNTER
Travis Saldaña in [address] [facility] is in need of an updated CT Lung Screen Order reflecting your note from 10/27/20 reading 1 PPD, Quit smoking 10/31/11. Current order is conflicting noting discrepancy of date and PPD. I did clarify with her correction reflecting your office note but they do need a new order. Please advise.

## 2020-11-20 ENCOUNTER — TELEPHONE (OUTPATIENT)
Dept: ENT CLINIC | Age: 75
End: 2020-11-20

## 2020-11-20 NOTE — TELEPHONE ENCOUNTER
Patient called and stated that she is still having the cough. She stated that it is not getting any better. She stated that she will keep coughing and it can last up to 5 mins, she stated that it causes her to get lightheaded and feeling like she going to pass out. Patient was on losartan however she stopped it about a month ago and was placed on amlodipine. She stated that she has done everything like she told and she doesn't seem to have improvement. She stated that she has taken the mucinex DM which she was went through about 3 bottles. She also stated that she is taking the protonix at supper. She wanted to know if there was something else, she stated she didn't want to go to the ER but she doesn't know what to do. Asked patient if she was having any fever, SOB or body aches and she denied all other symptoms. She stated that she just has the cough.

## 2020-11-20 NOTE — TELEPHONE ENCOUNTER
Called patient and discussed she stated that she just had a chest ct done recently. She stated that she has avoided snacking within 3-4 hrs of going to head and she has elevated the head of her bed. She denied having chest pain. She stated that she will get the chest xray in the next couple days if she doesn't notice a change. Informed patient is she develops cheat pain, SOB, Fevers, chills, coughing up blood or any other concerns then we would like her to be evaluated in the ER. She verbalized understanding.

## 2020-11-20 NOTE — TELEPHONE ENCOUNTER
Discussed with Dr. Dani Slaughter. It can take up to 2 months for the losartan to work out of her system. Has she avoided snacking within 3-4 hours of going to bed and has she elevated the head of her bed? Does she deny coughing up blood/sputum, chest pain? I will order a chest x-ray to assess her lungs. If she were to develop chest pain, shortness of breath, fevers, chills, coughing up blood, or other concerning symptoms she needs to go to the ER.

## 2020-11-21 NOTE — PROGRESS NOTES
[facility]  Pulmonary medicine clinic follow up note. Patient: James Diaz  :       Chief complaint/[place] IRA: James Diaz is a 77 y.o. old female came for follow up regarding her shortness of breath after having Low dose CT chest and echocardiogram.  She still waiting to have her full pulmonary function tests to be done next week. She cannot say what her pulmonary function tests appointment due to persistent cough. She was evaluated by Dr. Uriel Michelle MD from ENT service. She underwent fiberoptic laryngoscopy during his visit on 3 November 2020. She was diagnosed with reflux laryngitis due to her gastroesophageal reflux disease. She is continuing on Protonix 40 mg p.o. daily along with the GERD measures. Her cough was significantly got better from her last visit however she still having occasional cough. She still having exertional dyspnea with minimal improvement compared to last visit. She never saw any cardiologist in the past.    This patient is established at our sleep clinic for ORESTES on CPAP. She is currently not using any oxygen supplementation at rest, exercise or during sleep/at night time. She was never diagnosed with pulmonary diseases I.e bronchial asthma, COPD,Pulmonary fibrosis, Sarcoidosis, pulmonary embolism,pulmonary hypertension or pleural effusion/s in the past.    She denies any hemoptysis. She was never diagnosed with pulmonary tuberculosis in the past. She denies any recent exposure to any patients with tuberculosis. She denies any recent travel to endemic places of tuberculosis. She  denies to TB exposure in the past.  She denies any history of exposure to patients with tuberculosis in the past. She denies any recent travel history to endemic places to tuberculosis. Social History:  Occupation:  She is current working: No  Type of profession: retired.  - [employer]                        History of tobacco smoking:Yes  Amount of tobacco smokin.0 PPD. Years of tobacco smokin. Quit smoking: Yes. Quit [id number]   . History of recreational or IV drug use in the past:NO     History of exposure to coal mines/coal dust: NO  History of exposure to foundry dust/welding: NO  History of exposure to quarry/silica/sandblasting: NO  History of exposure to asbestos/working with breaks/ships: NO  History of exposure to farm dust: NO  History of recent travel to long distances: NO  History of exposure to birds, pigeons, or chickens in the past:NO  Pet animals at home:No  Had a dog before. History of pulmonary embolism in the past: No            History of DVT in the past:No                             Review of Systems:   General/Constitutional: she lost 5lbs of weight from the last visit with normal appetite. No fever or chills. HENT: Negative. Eyes: Negative. Upper respiratory tract: No nasal stuffiness or post nasal drip. Lower respiratory tract/ lungs: Still having occasional dry cough with no sputum production. No hemoptysis. Cardiovascular: No palpitations or chest pain. Gastrointestinal: No nausea or vomiting. Neurological: No focal neurologiacal weakness. Extremities: No edema. Musculoskeletal: No complaints. Genitourinary: No complaints. Hematological: Negative. Psychiatric/Behavioral: Negative. Skin: No itching.         Current Medications:        Past Medical History:   Diagnosis Date    COPD (chronic obstructive pulmonary disease) (HCC)     DJD (degenerative joint disease)     GERD (gastroesophageal reflux disease)     Hypertension     IBS (irritable bowel syndrome)     Insomnia     Osteoarthritis     Pap smear, as part of routine gynecological examination 3-2011    PPD positive 7/15/2015    Screening mammogram 3-2011    Sleep apnea        Past Surgical History:   Procedure Laterality Date    HEMORRHOID SURGERY      HYSTERECTOMY      partial    JOINT REPLACEMENT      Lt total knee    VOCAL CORD AUGMENTATION W/RADIESSE INJ  3-2011       Allergies   Allergen Reactions    Aleve [Naproxen Sodium] Other (See Comments)     Upsets stomach    Lisinopril Other (See Comments)     Cough        Current Outpatient Medications   Medication Sig Dispense Refill    amLODIPine (NORVASC) 2.5 MG tablet Take 2.5 mg by mouth daily      AMITIZA 24 MCG capsule TK 1 C PO BID WF      fluticasone (FLONASE) 50 MCG/ACT nasal spray 1 spray by Each Nostril route daily 1 Bottle 3    triamterene-hydrochlorothiazide (MAXZIDE-25) 37.5-25 MG per tablet TK 1 T PO QD IN THE MORNING      HYDROcodone-acetaminophen (NORCO) 5-325 MG per tablet TK 1 T PO  Q 8 H  PRN      UNABLE TO FIND amitiza      Misc Natural Products (AM/PM MENOPAUSE FORMULA PO) Take by mouth      Ascorbic Acid (VITAMIN C PO) Take by mouth      Cetirizine HCl (ZYRTEC ALLERGY PO) Take by mouth      acetaminophen 650 MG TABS Take 650 mg by mouth every 4 hours as needed 120 tablet 3    levothyroxine (SYNTHROID) 25 MCG tablet TAKE 1 TABLET BY MOUTH DAILY 30 tablet 6    pantoprazole (PROTONIX) 40 MG tablet TAKE 1 TABLET BY MOUTH DAILY. 30 tablet 6     No current facility-administered medications for this visit. Family History   Problem Relation Age of Onset    Diabetes Mother     High Blood Pressure Mother     High Blood Pressure Father     Diabetes Father     Stroke Sister     Diabetes Sister     High Blood Pressure Sister     Diabetes Brother     Cancer Brother            Physical Exam:    VITALS:  /74 (Site: Left Upper Arm, Position: Sitting, Cuff Size: Large Adult)   Pulse 67   Temp 98.2 °F (36.8 °C) (Tympanic)   Ht 5' 3\" (1.6 m)   Wt 202 lb (91.6 kg)   SpO2 96% Comment: room air  BMI 35.78 kg/m²   Nursing note and vitals reviewed. Constitutional: Patient appears moderately built and moderately nourished. No distress.  Patient is oriented to person, place, and time. HENT:   Head: Normocephalic and atraumatic. Right Ear: External ear normal.   Left Ear: External ear normal.   Mouth/Throat: Oropharynx is clear and moist.  No oral thrush. Eyes: Conjunctivae are normal. Pupils are equal, round, and reactive to light. No scleral icterus. Neck: Neck supple. No JVD present. No tracheal deviation present. Cardiovascular: Normal rate, regular rhythm, normal heart sounds. No murmur heard. Pulmonary/Chest: Effort normal and breath sounds normal. No stridor. No respiratory distress. No wheezes. No rales. Patient exhibits no tenderness. Abdominal: Soft. Patient exhibits no distension. No tenderness. Musculoskeletal: Normal range of motion. Extremities: Patient exhibits no edema and no tenderness. Lymphadenopathy:  No cervical adenopathy. Neurological: Patient is alert and oriented to person, place, and time. Skin: Skin is warm and dry. Patient is not diaphoretic. Psychiatric: Patient  has a normal mood and affect. Patient behavior is normal.       Mallampati airway Class:IV  Neck Circumference:16.5 Inches    Diagnostic Data:    Radiological Data:  Chest x-ray 1 view at [facility] on 9/8/2020  FINDINGS:  EKG leads project over the chest.    The lungs are clear and expanded. There is no demonstrated pleural  abnormality. There is mild cardiac enlargement. Normal mediastinum and laura. Normal  visualized pulmonary arteries. There is atherosclerotic tortuosity of the  aortic arch and descending thoracic aorta. Normal visualized thoracic spine. Normal visualized ribs, clavicles, and  shoulders. There is no demonstrated abnormality of the visualized soft tissue  structures of the upper abdomen. IMPRESSION:  Stable, nonacute portable x-ray examination of the chest.      Pulmonary function tests:  [facility] 3-4 years ago. Patient not able to undergo pulmonary function test as requested at the last visit due to her persistent cough.   Her PFTs were rescheduled for next week. Low dose CT chest: 11/18/2020      The above radiological study films were not available for me to review. Echocardiogram: 11/18/2020                        Assessment:  -Hx of COPD of uncertain severity. - still waiting for PFT records  -Exertional dyspnea of uncertain etiology. Differential diagnoses include questionable COPD Vs coronary artery disease Vs deconditioning from her obesity.  -Chronic cough most likely due to reflux laryngitis Vs uncontrolled gastroesophageal reflux disease Vs other etiologies. She was evaluated by Dr. Darnell Schmitt MD from ENT service and found to have a reflux laryngitis. -GERD on treatment with PPI. -Mild obstructive sleep apnea on treatment with auto CPAP level minimum pressure 6 cmH2O and maximum pressure 20 cmH2O under good control with current therapy with significant improvement in clinical symptoms.  -Periodic limb movements with no significant arousals.  -Hypertension on meds- under control        Recommendations/Plan:  -Please obtain low dose CT scan of chest films from [facility] dated 18 November 2020 for review.  -She was advised to keep her scheduled appointment for full pulmonary function tests next week at [facility] PFTs lab. -We will request a methacholine challenge test to be done after her above PFTs if they are found to be within normal limits to evaluate for hyperreactive airway disease.  -Continue patient on Flonase 50mcg/INH 2sprays each nostril daily.   -The pathophysiology of reflux is discussed. Anti-reflux measures such as raising the head of the bed, avoiding tight clothing or belts, avoiding eating late at night and not lying down shortly after mealtime and achieving weight loss are discussed.  Avoid ASA, NSAID's, caffeine, peppermints, alcohol and tobacco. Patient should alert me if there are persistent symptoms, dysphagia, weight loss or GI bleeding.  -Schedule patient for [facility] consultation as soon as possible for further evaluation and management of GERD causing her chronic cough. Please refer to printed/Epic order sheet regarding the referring provider details.  -Schedule patient for Cardiology consult with [facility] Cardiology as soon as possible for further evaluation of abnormal CT chest dated 11/18/2020 reported as Atherosclerosis noted in her Coronaries to evaluate for CAD as an etiology for her exertional dyspnea. -Continue Protonix 40 mg p.o. daily as prescribed by her family physician. She get her refills from family physician.  -She is currently following with the [facility] Peterson Durham C[facility] her [facility] apnea. She was seen by Mr. Peterson uDrham CNP on 17 September 2020 for the last time. She was advised to keep her follow up with his clinic.  - Schedule patient for follow up with my clinic in 1month with recommended tests I.e PFTS and MCCT test. Patient advised to make early appointment if needed. - [place] Alert educated about my impression and plan. She verbalizes understanding.      -I personally reviewed updated the Past medical hx, Past surgical hx,Social hx, Family hx, Medications, Allergies in the discrete data section of the patient chart along with labs, Pulmonary medicine,Sleep medicine related, Pathological, Microbiological and Radiological investigations. Total time spent interviewing the patient, evaluating lab data and X-ray data and processing orders was 45 Minutes. I personally spent more than 50% of the appointment time face to face with the patient providing counseling and coordinating the patient's care.

## 2020-11-24 ENCOUNTER — HOSPITAL ENCOUNTER (OUTPATIENT)
Dept: CT IMAGING | Age: 75
Discharge: HOME | End: 2020-11-24
Payer: MEDICARE

## 2020-11-24 ENCOUNTER — OFFICE VISIT (OUTPATIENT)
Dept: PULMONOLOGY | Age: 75
End: 2020-11-24
Payer: MEDICARE

## 2020-11-24 VITALS
TEMPERATURE: 98.2 F | OXYGEN SATURATION: 96 % | HEART RATE: 67 BPM | BODY MASS INDEX: 35.79 KG/M2 | DIASTOLIC BLOOD PRESSURE: 74 MMHG | WEIGHT: 202 LBS | HEIGHT: 63 IN | SYSTOLIC BLOOD PRESSURE: 136 MMHG

## 2020-11-24 PROCEDURE — G8427 DOCREV CUR MEDS BY ELIG CLIN: HCPCS | Performed by: INTERNAL MEDICINE

## 2020-11-24 PROCEDURE — 1036F TOBACCO NON-USER: CPT | Performed by: INTERNAL MEDICINE

## 2020-11-24 PROCEDURE — 3017F COLORECTAL CA SCREEN DOC REV: CPT | Performed by: INTERNAL MEDICINE

## 2020-11-24 PROCEDURE — G8417 CALC BMI ABV UP PARAM F/U: HCPCS | Performed by: INTERNAL MEDICINE

## 2020-11-24 PROCEDURE — 4040F PNEUMOC VAC/ADMIN/RCVD: CPT | Performed by: INTERNAL MEDICINE

## 2020-11-24 PROCEDURE — 3209999900 CT COMPARISON OF OUTSIDE FILMS

## 2020-11-24 PROCEDURE — 99215 OFFICE O/P EST HI 40 MIN: CPT | Performed by: INTERNAL MEDICINE

## 2020-11-24 PROCEDURE — 1090F PRES/ABSN URINE INCON ASSESS: CPT | Performed by: INTERNAL MEDICINE

## 2020-11-24 PROCEDURE — G8484 FLU IMMUNIZE NO ADMIN: HCPCS | Performed by: INTERNAL MEDICINE

## 2020-11-24 PROCEDURE — 1123F ACP DISCUSS/DSCN MKR DOCD: CPT | Performed by: INTERNAL MEDICINE

## 2020-11-24 PROCEDURE — G8400 PT W/DXA NO RESULTS DOC: HCPCS | Performed by: INTERNAL MEDICINE

## 2020-11-24 NOTE — PROGRESS NOTES
Neck Circumference -  16.50   Mallampati - 4    Lung Nodule Screening     [] Qualifies    [] Does not qualify   [] Declined    [] Completed

## 2020-11-24 NOTE — PATIENT INSTRUCTIONS
Recommendations/Plan:  -Please obtain low dose CT scan of chest films from [facility] dated 18 November 2020 for review.  -She was advised to keep her scheduled appointment for full pulmonary function tests next week at [facility] PFTs lab. -We will request a methacholine challenge test to be done after her above PFTs if they are found to be within normal limits to evaluate for hyperreactive airway disease.  -Continue patient on Flonase 50mcg/INH 2sprays each nostril daily.   -The pathophysiology of reflux is discussed. Anti-reflux measures such as raising the head of the bed, avoiding tight clothing or belts, avoiding eating late at night and not lying down shortly after mealtime and achieving weight loss are discussed. Avoid ASA, NSAID's, caffeine, peppermints, alcohol and tobacco. Patient should alert me if there are persistent symptoms, dysphagia, weight loss or GI bleeding.  -Schedule patient for [facility] consultation as soon as possible for further evaluation and management of GERD causing her chronic cough. Please refer to printed/Epic order sheet regarding the referring provider details.  -Schedule patient for Cardiology consult with [facility] Cardiology as soon as possible for further evaluation of abnormal CT chest dated 11/18/2020 reported as Atherosclerosis noted in her Coronaries to evaluate for CAD as an etiology for her exertional dyspnea. -Continue Protonix 40 mg p.o. daily as prescribed by her family physician. She get her refills from family physician.  -She is currently following with the [facility] Gregorio [facility] C[facility] her sleep apnea. She was seen by Mr. Gregorio Castillo CNP on 17 September 2020 for the last time. She was advised to keep her follow up with his clinic.  - Schedule patient for follow up with my clinic in 1month with recommended tests I.e PFTS and MCCT test. Patient advised to make early appointment if needed.    - Derek ordaz about my impression and plan. She verbalizes understanding.

## 2020-12-07 ENCOUNTER — OFFICE VISIT (OUTPATIENT)
Dept: CARDIOLOGY CLINIC | Age: 75
End: 2020-12-07
Payer: MEDICARE

## 2020-12-07 VITALS
HEART RATE: 71 BPM | WEIGHT: 203 LBS | HEIGHT: 63 IN | BODY MASS INDEX: 35.97 KG/M2 | SYSTOLIC BLOOD PRESSURE: 130 MMHG | DIASTOLIC BLOOD PRESSURE: 72 MMHG

## 2020-12-07 PROCEDURE — 1036F TOBACCO NON-USER: CPT | Performed by: INTERNAL MEDICINE

## 2020-12-07 PROCEDURE — 4040F PNEUMOC VAC/ADMIN/RCVD: CPT | Performed by: INTERNAL MEDICINE

## 2020-12-07 PROCEDURE — 93000 ELECTROCARDIOGRAM COMPLETE: CPT | Performed by: INTERNAL MEDICINE

## 2020-12-07 PROCEDURE — G8427 DOCREV CUR MEDS BY ELIG CLIN: HCPCS | Performed by: INTERNAL MEDICINE

## 2020-12-07 PROCEDURE — G8400 PT W/DXA NO RESULTS DOC: HCPCS | Performed by: INTERNAL MEDICINE

## 2020-12-07 PROCEDURE — 1123F ACP DISCUSS/DSCN MKR DOCD: CPT | Performed by: INTERNAL MEDICINE

## 2020-12-07 PROCEDURE — G8417 CALC BMI ABV UP PARAM F/U: HCPCS | Performed by: INTERNAL MEDICINE

## 2020-12-07 PROCEDURE — 3017F COLORECTAL CA SCREEN DOC REV: CPT | Performed by: INTERNAL MEDICINE

## 2020-12-07 PROCEDURE — 99204 OFFICE O/P NEW MOD 45 MIN: CPT | Performed by: INTERNAL MEDICINE

## 2020-12-07 PROCEDURE — 1090F PRES/ABSN URINE INCON ASSESS: CPT | Performed by: INTERNAL MEDICINE

## 2020-12-07 PROCEDURE — G8484 FLU IMMUNIZE NO ADMIN: HCPCS | Performed by: INTERNAL MEDICINE

## 2020-12-07 NOTE — PROGRESS NOTES
[address] [place] [address],[place] [address] [address] [place] [address]  Dept: [phone]  Dept Fax: [phone]  Loc: [phone]    Visit Date: 12/7/2020    Ms. Lenny Favre is a 77 y.o. female  who presented for:  Chief Complaint   Patient presents with    New Patient    Shortness of Breath    Cough    Results     CT Chest       HPI:   76 yo F c hx of ?COPD, HTN, IBD was referred from pulmonary clinic for exertional dyspnea. Patient had some workup at [facility] and subsequently has been following up with Dr. Syd Platt. She is scheduled to get full pulmonary workup on 12/18/20. She also had CT chest on 11/2020 which showed coronary artery calcification. She has never seen a cardiology. Patient can barely walk a block before getting short of breath. These symptoms have been worsening over the course of 1 yr. Current Outpatient Medications:     amLODIPine (NORVASC) 2.5 MG tablet, Take 2.5 mg by mouth daily, Disp: , Rfl:     AMITIZA 24 MCG capsule, TK 1 C PO BID WF, Disp: , Rfl:     fluticasone (FLONASE) 50 MCG/ACT nasal spray, 1 spray by Each Nostril route daily, Disp: 1 Bottle, Rfl: 3    triamterene-hydrochlorothiazide (MAXZIDE-25) 37.5-25 MG per tablet, TK 1 T PO QD IN THE MORNING, Disp: , Rfl:     HYDROcodone-acetaminophen (NORCO) 5-325 MG per tablet, TK 1 T PO  Q 8 H  PRN, Disp: , Rfl:     UNABLE TO FIND, amitiza, Disp: , Rfl:     Misc Natural Products (AM/PM MENOPAUSE FORMULA PO), Take by mouth, Disp: , Rfl:     Ascorbic Acid (VITAMIN C PO), Take by mouth, Disp: , Rfl:     Cetirizine HCl (ZYRTEC ALLERGY PO), Take by mouth, Disp: , Rfl:     acetaminophen 650 MG TABS, Take 650 mg by mouth every 4 hours as needed, Disp: 120 tablet, Rfl: 3    levothyroxine (SYNTHROID) 25 MCG tablet, TAKE 1 TABLET BY MOUTH DAILY, Disp: 30 tablet, Rfl: 6    pantoprazole (PROTONIX) 40 MG tablet, TAKE 1 TABLET BY MOUTH DAILY. , Disp: 30 tablet, Rfl: 6    Past Medical History  Lolis  has a past medical history of COPD (chronic obstructive pulmonary disease) ([address] DJD (degenerative joint disease), GERD (gastroesophageal reflux disease), Hypertension, IBS (irritable bowel syndrome), Insomnia, Osteoarthritis, Pap smear, as part of routine gynecological examination, PPD positive, Screening mammogram, and Sleep apnea. Social History  Lolis  reports that she quit smoking about 10 years ago. Her smoking use included cigarettes. She started smoking about 40 years ago. She has a 22.50 pack-year smoking history. She has never used smokeless tobacco. She reports current alcohol use. She reports that she does not use drugs. Family History  L[place] family history includes Cancer in her brother; Diabetes in her brother, father, mother, and sister; High Blood Pressure in her father, mother, and sister; Stroke in her sister. Past Surgical History   Past Surgical History:   Procedure Laterality Date    HEMORRHOID SURGERY      HYSTERECTOMY      partial    JOINT REPLACEMENT      Lt total knee    VOCAL CORD AUGMENTATION W/RADIESSE INJ  3-2011       Subjective:     REVIEW OF SYSTEMS  Constitutional: denies sweats, chills and fever  HENT: denies  congestion, sinus pressure, sneezing and sore throat. Eyes: denies  pain, discharge, redness and itching. Respiratory: denies apnea, cough  Gastrointestinal: denies blood in stool, constipation, diarrhea   Endocrine: denies cold intolerance, heat intolerance, polydipsia. Genitourinary: denies dysuria, enuresis, flank pain and hematuria. Musculoskeletal: denies arthralgias, joint swelling and neck pain. Neurological: denies numbness and headaches. Psychiatric/Behavioral: denies agitation, confusion, decreased concentration and dysphoric mood    All others reviewed and are negative.    Objective:     /72   Pulse 71   Ht 5' 3\" (1.6 m)   Wt 203 lb (92.1 kg)   BMI 35.96 kg/m²     Wt Readings from Last 3 Encounters:   12/07/20 203 lb (92.1 kg)   11/24/20 202 lb (91.6 kg)   11/03/20 208 lb 12.8 oz (94.7 kg)     BP Readings from Last 3 Encounters:   12/07/20 130/72   11/24/20 136/74   11/03/20 118/70       PHYSICAL EXAM  Constitutional: Oriented to person, place, and time. Appears well-developed and well-nourished. HENT:   Head: Normocephalic and atraumatic. Eyes: EOM are normal. Pupils are equal, round, and reactive to light. Neck: Normal range of motion. Neck supple. No JVD present. Cardiovascular: Normal rate , normal heart sounds and intact distal pulses. Pulmonary/Chest: Effort normal and breath sounds normal. No respiratory distress. No wheezes. No rales. Abdominal: Soft. Bowel sounds are normal. No distension. There is no tenderness. Musculoskeletal: Normal range of motion. No edema. Neurological: Alert and oriented to person, place, and time. No cranial nerve deficit. Coordination normal.   Skin: Skin is warm and dry. Psychiatric: Normal mood and affect.        Lab Results   Component Value Date    CKTOTAL 60 12/29/2011       Lab Results   Component Value Date    WBC 9.3 09/08/2020    RBC 4.53 09/08/2020    RBC 4.56 12/29/2011    HGB 13.4 09/08/2020    HCT 40.3 09/08/2020    MCV 89.1 09/08/2020    MCH 29.7 09/08/2020    MCHC 33.3 09/08/2020    RDW 14.5 09/08/2020     09/08/2020    MPV 9.3 10/14/2014       Lab Results   Component Value Date     10/01/2020    K 3.8 10/01/2020     10/01/2020    CO2 31 10/01/2020    BUN 11 10/01/2020    LABALBU 4.0 09/08/2020    LABALBU 4.1 12/29/2011    CREATININE 0.93 10/01/2020    CALCIUM 9.1 10/01/2020    LABGLOM 67 10/14/2014    GLUCOSE 97 10/01/2020       Lab Results   Component Value Date    ALKPHOS 76 09/08/2020    ALT 10 09/08/2020    AST 15 09/08/2020    PROT 6.6 09/08/2020    BILITOT 0.6 09/08/2020    BILIDIR 0.1 09/08/2020    LABALBU 4.0 09/08/2020    LABALBU 4.1 12/29/2011       Lab Results   Component Value Date    MG 1.7 09/08/2020       Lab Results

## 2020-12-07 NOTE — PROGRESS NOTES
NP, referred for abnormal CT. She denies having any chest pain, dizziness, lightheadedness, palpitations or DAVID. She does have shortness of breath with exertion. EKG completed.

## 2020-12-15 ENCOUNTER — HOSPITAL ENCOUNTER (OUTPATIENT)
Age: 75
Setting detail: SPECIMEN
Discharge: HOME | End: 2020-12-15
Payer: MEDICARE

## 2020-12-15 PROCEDURE — U0003 INFECTIOUS AGENT DETECTION BY NUCLEIC ACID (DNA OR RNA); SEVERE ACUTE RESPIRATORY SYNDROME CORONAVIRUS 2 (SARS-COV-2) (CORONAVIRUS DISEASE [COVID-19]), AMPLIFIED PROBE TECHNIQUE, MAKING USE OF HIGH THROUGHPUT TECHNOLOGIES AS DESCRIBED BY CMS-2020-01-R: HCPCS

## 2020-12-16 LAB
PERFORMING LAB: ABNORMAL
REPORT: ABNORMAL
SARS-COV-2: DETECTED

## 2020-12-23 ENCOUNTER — TELEPHONE (OUTPATIENT)
Dept: ENT CLINIC | Age: 75
End: 2020-12-23

## 2020-12-23 NOTE — TELEPHONE ENCOUNTER
Patient called wanting to reschedule appointment with Dr. Uriah Cope for constant throat clearing and a persistent cough. I tried to schedule her but KELSI Mora's schedule for January is still closed.  I told her that we will call her back when to schedule is open

## 2020-12-23 NOTE — TELEPHONE ENCOUNTER
Appointment made for 01/05/21 with Dr. Kahn . Patient will be 3 weeks out from COVID-19 diagnosis on 12/15/2020.

## 2021-01-04 ENCOUNTER — HOSPITAL ENCOUNTER (OUTPATIENT)
Age: 76
Setting detail: SPECIMEN
Discharge: HOME | End: 2021-01-04
Payer: MEDICARE

## 2021-01-04 PROCEDURE — U0003 INFECTIOUS AGENT DETECTION BY NUCLEIC ACID (DNA OR RNA); SEVERE ACUTE RESPIRATORY SYNDROME CORONAVIRUS 2 (SARS-COV-2) (CORONAVIRUS DISEASE [COVID-19]), AMPLIFIED PROBE TECHNIQUE, MAKING USE OF HIGH THROUGHPUT TECHNOLOGIES AS DESCRIBED BY CMS-2020-01-R: HCPCS

## 2021-01-04 NOTE — PROGRESS NOTES
JOINT REPLACEMENT      Lt total knee    VOCAL CORD AUGMENTATION W/RADIESSE INJ  3-2011       Allergies   Allergen Reactions    Aleve [Naproxen Sodium] Other (See Comments)     Upsets stomach    Lisinopril Other (See Comments)     Cough        Current Outpatient Medications   Medication Sig Dispense Refill    amLODIPine (NORVASC) 2.5 MG tablet Take 2.5 mg by mouth daily      AMITIZA 24 MCG capsule TK 1 C PO BID WF      fluticasone (FLONASE) 50 MCG/ACT nasal spray 1 spray by Each Nostril route daily 1 Bottle 3    triamterene-hydrochlorothiazide (MAXZIDE-25) 37.5-25 MG per tablet TK 1 T PO QD IN THE MORNING      HYDROcodone-acetaminophen (NORCO) 5-325 MG per tablet TK 1 T PO  Q 8 H  PRN      UNABLE TO FIND amitiza      Misc Natural Products (AM/PM MENOPAUSE FORMULA PO) Take by mouth      Ascorbic Acid (VITAMIN C PO) Take by mouth      Cetirizine HCl (ZYRTEC ALLERGY PO) Take by mouth      acetaminophen 650 MG TABS Take 650 mg by mouth every 4 hours as needed 120 tablet 3    levothyroxine (SYNTHROID) 25 MCG tablet TAKE 1 TABLET BY MOUTH DAILY 30 tablet 6    pantoprazole (PROTONIX) 40 MG tablet TAKE 1 TABLET BY MOUTH DAILY. 30 tablet 6     No current facility-administered medications for this visit. Family History   Problem Relation Age of Onset    Diabetes Mother     High Blood Pressure Mother     High Blood Pressure Father     Diabetes Father     Stroke Sister     Diabetes Sister     High Blood Pressure Sister     Diabetes Brother     Cancer Brother            Physical Exam:    VITALS:  /72 (Site: Left Upper Arm, Position: Sitting)   Pulse 66   Temp 98 °F (36.7 °C)   Ht 5' 1\" (1.549 m)   Wt 198 lb (89.8 kg)   SpO2 97% Comment: on RA  BMI 37.41 kg/m²   Nursing note and vitals reviewed. Constitutional: Patient appears moderately built and moderately nourished. No distress. Patient is oriented to person, place, and time. HENT:   Head: Normocephalic and atraumatic.    Right Ear: External ear normal.   Left Ear: External ear normal.   Mouth/Throat: Oropharynx is clear and moist.  No oral thrush. Eyes: Conjunctivae are normal. Pupils are equal, round, and reactive to light. No scleral icterus. Neck: Neck supple. No JVD present. No tracheal deviation present. Cardiovascular: Normal rate, regular rhythm, normal heart sounds. No murmur heard. Pulmonary/Chest: Effort normal and breath sounds normal. No stridor. No respiratory distress. Bilateral expiratory wheezes. No rales. Patient exhibits no tenderness. Abdominal: Soft. Patient exhibits no distension. No tenderness. Musculoskeletal: Normal range of motion. Extremities: Patient exhibits no edema and no tenderness. Lymphadenopathy:  No cervical adenopathy. Neurological: Patient is alert and oriented to person, place, and time. Skin: Skin is warm and dry. Patient is not diaphoretic. Psychiatric: Patient  has a normal mood and affect. Patient behavior is normal.         Mallampati airway Class:IV  Neck Circumference:16.5 Inches    Diagnostic Data:    Radiological Data:  Chest x-ray 1 view at [facility] on 9/8/2020  FINDINGS:  EKG leads project over the chest.    The lungs are clear and expanded. There is no demonstrated pleural  abnormality. There is mild cardiac enlargement. Normal mediastinum and laura. Normal  visualized pulmonary arteries. There is atherosclerotic tortuosity of the  aortic arch and descending thoracic aorta. Normal visualized thoracic spine. Normal visualized ribs, clavicles, and  shoulders. There is no demonstrated abnormality of the visualized soft tissue  structures of the upper abdomen. IMPRESSION:  Stable, nonacute portable x-ray examination of the chest.      Pulmonary function tests:  [facility] 3-4 years ago. Patient not able to undergo pulmonary function test as requested at the last visit due to her persistent cough. Her PFTs were rescheduled for next week.     Low dose CT chest: season with out fail. Patient verbalizes understanding.  -Will schedule patient for low dose CT scan of chest with out IV contrast as recommended by the  U.S. Preventive Services Task Force and the NCCN for lung Cancer Screening in November, 2021  -Continue patient on Flonase 50mcg/INH 2sprays each nostril daily.   -She was advised to practice anti-reflux measures such as raising the head of the bed, avoiding tight clothing or belts, avoiding eating late at night and not lying down shortly after mealtime and achieving weight loss are discussed. Avoid ASA, NSAID's, caffeine, peppermints, alcohol and tobacco.   -She was advised to keep her scheduled follow-up with MD Ced[facility] Cardiologist for further evaluation of abnormal CT chest dated 11/18/2020 reported as Atherosclerosis noted in her Coronaries. She is currently waiting for a stress test  -Continue Protonix 40 mg p.o. daily as prescribed by her family physician. She get her refills from family physician.  -She is currently following with the [facility]. She was seen by Mr. Neena Vo CNP on 17 September 2020 for the last time. She was advised to keep her follow up with his clinic.   -Schedule patient for follow up with my clinic in 3months for clinical reevaluation and in November 2021 with recommended low-dose CT scan of chest. Patient advised to make early appointment if needed.  -At the request of the patient she was given a prescription for CPAP machine to reset to a CPAP pressure of 13 cm of water as per her old sleep medicine clinic records from [facility], dated 7 March 2018. We will arrange for follow-up with [facility] PA-C clinic in 4 to 6 weeks with a download from her CPAP machine.  - Trenton Sutton educated about my impression and plan.  She verbalizes understanding.      -I personally reviewed updated the Past medical hx, Past surgical hx,Social hx, Family hx, Medications, Allergies in the discrete data section of the patient chart along with labs, Pulmonary medicine,Sleep medicine related, Pathological, Microbiological and Radiological investigations. 76

## 2021-01-06 LAB
PERFORMING LAB: ABNORMAL
REPORT: ABNORMAL
SARS-COV-2: DETECTED

## 2021-01-07 ENCOUNTER — HOSPITAL ENCOUNTER (OUTPATIENT)
Dept: PULMONOLOGY | Age: 76
Discharge: HOME | End: 2021-01-07
Payer: MEDICARE

## 2021-01-07 DIAGNOSIS — Z87.09 HISTORY OF COPD: ICD-10-CM

## 2021-01-07 DIAGNOSIS — R06.02 SOB (SHORTNESS OF BREATH) ON EXERTION: ICD-10-CM

## 2021-01-07 DIAGNOSIS — Z87.891 PERSONAL HISTORY OF TOBACCO USE, PRESENTING HAZARDS TO HEALTH: ICD-10-CM

## 2021-01-07 PROCEDURE — 94060 EVALUATION OF WHEEZING: CPT

## 2021-01-07 PROCEDURE — 94726 PLETHYSMOGRAPHY LUNG VOLUMES: CPT

## 2021-01-07 PROCEDURE — 94729 DIFFUSING CAPACITY: CPT

## 2021-01-07 NOTE — PROGRESS NOTES
Prescreening performed prior to testing. The following symptoms may indicate COVID-19 infection:        One of the following criteria:   Temperature taken, patient temperature was 97.8 F. Fever greater 100.0 F -no  Cough -  no  New onset shortness of breath -no  New onset difficulty breathing -no        And/or   Two or more of the following criteria:  Muscle aches -no  Headache -no  Sore throat -no  New onset loss of smell/taste -no  New onset diarrhea -no    Patient's screening was negative. PFT will be performed.

## 2021-01-19 ENCOUNTER — TELEPHONE (OUTPATIENT)
Dept: PULMONOLOGY | Age: 76
End: 2021-01-19

## 2021-01-19 ENCOUNTER — OFFICE VISIT (OUTPATIENT)
Dept: PULMONOLOGY | Age: 76
End: 2021-01-19
Payer: MEDICARE

## 2021-01-19 VITALS
OXYGEN SATURATION: 97 % | HEART RATE: 66 BPM | TEMPERATURE: 98 F | DIASTOLIC BLOOD PRESSURE: 72 MMHG | WEIGHT: 198 LBS | SYSTOLIC BLOOD PRESSURE: 118 MMHG | BODY MASS INDEX: 37.38 KG/M2 | HEIGHT: 61 IN

## 2021-01-19 DIAGNOSIS — J44.9 MODERATE COPD (CHRONIC OBSTRUCTIVE PULMONARY DISEASE) (HCC): Primary | ICD-10-CM

## 2021-01-19 DIAGNOSIS — G47.33 OSA ON CPAP: ICD-10-CM

## 2021-01-19 DIAGNOSIS — Z99.89 OSA ON CPAP: ICD-10-CM

## 2021-01-19 DIAGNOSIS — Z72.0 TOBACCO ABUSE: ICD-10-CM

## 2021-01-19 DIAGNOSIS — Z87.891 PERSONAL HISTORY OF TOBACCO USE, PRESENTING HAZARDS TO HEALTH: ICD-10-CM

## 2021-01-19 PROCEDURE — G8427 DOCREV CUR MEDS BY ELIG CLIN: HCPCS | Performed by: INTERNAL MEDICINE

## 2021-01-19 PROCEDURE — 3017F COLORECTAL CA SCREEN DOC REV: CPT | Performed by: INTERNAL MEDICINE

## 2021-01-19 PROCEDURE — 3023F SPIROM DOC REV: CPT | Performed by: INTERNAL MEDICINE

## 2021-01-19 PROCEDURE — G8926 SPIRO NO PERF OR DOC: HCPCS | Performed by: INTERNAL MEDICINE

## 2021-01-19 PROCEDURE — 4040F PNEUMOC VAC/ADMIN/RCVD: CPT | Performed by: INTERNAL MEDICINE

## 2021-01-19 PROCEDURE — G8417 CALC BMI ABV UP PARAM F/U: HCPCS | Performed by: INTERNAL MEDICINE

## 2021-01-19 PROCEDURE — 1090F PRES/ABSN URINE INCON ASSESS: CPT | Performed by: INTERNAL MEDICINE

## 2021-01-19 PROCEDURE — 1036F TOBACCO NON-USER: CPT | Performed by: INTERNAL MEDICINE

## 2021-01-19 PROCEDURE — G8400 PT W/DXA NO RESULTS DOC: HCPCS | Performed by: INTERNAL MEDICINE

## 2021-01-19 PROCEDURE — 1123F ACP DISCUSS/DSCN MKR DOCD: CPT | Performed by: INTERNAL MEDICINE

## 2021-01-19 PROCEDURE — 99214 OFFICE O/P EST MOD 30 MIN: CPT | Performed by: INTERNAL MEDICINE

## 2021-01-19 PROCEDURE — G8484 FLU IMMUNIZE NO ADMIN: HCPCS | Performed by: INTERNAL MEDICINE

## 2021-01-19 NOTE — TELEPHONE ENCOUNTER
Patient CPAP pressure was decreased to a CPAP pressure of 9 cm of water. Please fax the new prescription to her [employer]. Please keep her scheduled follow-up with the [facility] with a download on above describe CPAP pressure of 9 cm of water in 4 to 6 weeks.

## 2021-01-19 NOTE — PATIENT INSTRUCTIONS
Recommendations/Plan:  -Start patient on Stiolto Respimat ( Tiotropium bromide and Olodaterol) 2.5mcg/2.5mcg per actuation 2 puffs once daily. She was detailed about mechanism of action of drug along with associated side effects. She agreed to take the risk and medication. She verbalizes understanding. Patient educated and demonstrated in my office how to use above inhaler. Patient verbalizes understanding.  -Continue patient on Albuterol HFA 90mcg/Spray MDI, 2puffs  Q6Hprn. She  was informed about adverse effects of Albuterol HFA. She verbalizes understanding.  -Patient advised to continue current inhalers and keep good compliance. Patient verbalizes understanding.  - Patient educated to update his pneumococcal vaccine with family physician and take influenza vaccine in coming season with out fail. Patient verbalizes understanding.  -Will schedule patient for low dose CT scan of chest with out IV contrast as recommended by the  U.S. Preventive Services Task Force and the NCCN for lung Cancer Screening in November, 2021  -Continue patient on Flonase 50mcg/INH 2sprays each nostril daily.   -She was advised to practice anti-reflux measures such as raising the head of the bed, avoiding tight clothing or belts, avoiding eating late at night and not lying down shortly after mealtime and achieving weight loss are discussed. Avoid ASA, NSAID's, caffeine, peppermints, alcohol and tobacco.   -She was advised to keep her scheduled follow-up with [facility] for further evaluation of abnormal CT chest dated 11/18/2020 reported as Atherosclerosis noted in her Coronaries. She is currently waiting for a stress test  -Continue Protonix 40 mg p.o. daily as prescribed by her family physician. She get her refills from family physician. -She is currently following with the . Carlos Manuel Layton CNP at [facility] regarding her sleep apnea. She was seen by . Cam[place] VERONA Layton on 17 September 2020 for the last time. She was advised to keep her follow up with his clinic.   -Schedule patient for follow up with my clinic in 3months for clinical reevaluation and in November 2021 with recommended low-dose CT scan of chest. Patient advised to make early appointment if needed.  -At the request of the patient she was given a prescription for CPAP machine to reset to a CPAP pressure of 13 cm of water as per her old sleep medicine clinic records from Ms. Musa Jackman, dated 7 March 2018. We will arrange for follow-up with Ms. Musa Jackman PA-C clinic in 4 to 6 weeks with a download from her CPAP machine.  - Jacob Gotti educated about my impression and plan. She verbalizes understanding.

## 2021-01-26 ENCOUNTER — OFFICE VISIT (OUTPATIENT)
Dept: ENT CLINIC | Age: 76
End: 2021-01-26
Payer: MEDICARE

## 2021-01-26 VITALS
HEART RATE: 68 BPM | WEIGHT: 192 LBS | BODY MASS INDEX: 36.28 KG/M2 | SYSTOLIC BLOOD PRESSURE: 128 MMHG | RESPIRATION RATE: 16 BRPM | DIASTOLIC BLOOD PRESSURE: 80 MMHG | TEMPERATURE: 98.2 F

## 2021-01-26 DIAGNOSIS — T46.5X5A ADVERSE EFFECT OF ANGIOTENSIN 2 RECEPTOR ANTAGONIST, INITIAL ENCOUNTER: Primary | ICD-10-CM

## 2021-01-26 DIAGNOSIS — R09.89 CHRONIC THROAT CLEARING: ICD-10-CM

## 2021-01-26 PROCEDURE — G8484 FLU IMMUNIZE NO ADMIN: HCPCS | Performed by: OTOLARYNGOLOGY

## 2021-01-26 PROCEDURE — 3017F COLORECTAL CA SCREEN DOC REV: CPT | Performed by: OTOLARYNGOLOGY

## 2021-01-26 PROCEDURE — 1090F PRES/ABSN URINE INCON ASSESS: CPT | Performed by: OTOLARYNGOLOGY

## 2021-01-26 PROCEDURE — 99212 OFFICE O/P EST SF 10 MIN: CPT | Performed by: OTOLARYNGOLOGY

## 2021-01-26 PROCEDURE — 4040F PNEUMOC VAC/ADMIN/RCVD: CPT | Performed by: OTOLARYNGOLOGY

## 2021-01-26 PROCEDURE — G8417 CALC BMI ABV UP PARAM F/U: HCPCS | Performed by: OTOLARYNGOLOGY

## 2021-01-26 PROCEDURE — G8400 PT W/DXA NO RESULTS DOC: HCPCS | Performed by: OTOLARYNGOLOGY

## 2021-01-26 PROCEDURE — 1036F TOBACCO NON-USER: CPT | Performed by: OTOLARYNGOLOGY

## 2021-01-26 PROCEDURE — G8427 DOCREV CUR MEDS BY ELIG CLIN: HCPCS | Performed by: OTOLARYNGOLOGY

## 2021-01-26 PROCEDURE — 1123F ACP DISCUSS/DSCN MKR DOCD: CPT | Performed by: OTOLARYNGOLOGY

## 2021-01-26 ASSESSMENT — ENCOUNTER SYMPTOMS
TROUBLE SWALLOWING: 0
STRIDOR: 0
DIARRHEA: 0
CHOKING: 0
COLOR CHANGE: 0
SINUS PRESSURE: 0
APNEA: 0
WHEEZING: 0
FACIAL SWELLING: 0
NAUSEA: 0
ABDOMINAL PAIN: 0
VOMITING: 0
COUGH: 0
SORE THROAT: 0
VOICE CHANGE: 0
CHEST TIGHTNESS: 0
RHINORRHEA: 0
SHORTNESS OF BREATH: 0

## 2021-01-26 NOTE — PROGRESS NOTES
[facility] [place] [facility]  [facility] EAR, NOSE AND THROAT  [facility]  Dept: [phone]  Dept Fax: [phone]  Loc: [phone]    Griselda Khanna is a 77 y.o. female who was referred byNo ref. provider found for:  Chief Complaint   Patient presents with    Other     pt here for constant throat clearing and persistent cough    . HPI:     Griselda Khanna is a 77 y.o. female who presents today for follow-up on her throat clearing and cough. On her last visit she had already stopped the losartan but not long enough. She has stayed off of it and with some willpower has stopped clearing her throat as well. Her throat is now asymptomatic and she is very happy. Munir Thacker History: Allergies   Allergen Reactions    Aleve [Naproxen Sodium] Other (See Comments)     Upsets stomach    Lisinopril Other (See Comments)     Cough      Current Outpatient Medications   Medication Sig Dispense Refill    Tiotropium Bromide-Olodaterol 2.5-2.5 MCG/ACT AERS Inhale 2 puffs into the lungs daily 1 Inhaler 11    amLODIPine (NORVASC) 2.5 MG tablet Take 2.5 mg by mouth daily      AMITIZA 24 MCG capsule TK 1 C PO BID WF      fluticasone (FLONASE) 50 MCG/ACT nasal spray 1 spray by Each Nostril route daily 1 Bottle 3    triamterene-hydrochlorothiazide (MAXZIDE-25) 37.5-25 MG per tablet TK 1 T PO QD IN THE MORNING      HYDROcodone-acetaminophen (NORCO) 5-325 MG per tablet TK 1 T PO  Q 8 H  PRN      UNABLE TO FIND amitiza      Misc Natural Products (AM/PM MENOPAUSE FORMULA PO) Take by mouth      Ascorbic Acid (VITAMIN C PO) Take by mouth      Cetirizine HCl (ZYRTEC ALLERGY PO) Take by mouth      acetaminophen 650 MG TABS Take 650 mg by mouth every 4 hours as needed 120 tablet 3    levothyroxine (SYNTHROID) 25 MCG tablet TAKE 1 TABLET BY MOUTH DAILY 30 tablet 6    pantoprazole (PROTONIX) 40 MG tablet TAKE 1 TABLET BY MOUTH DAILY.  30 tablet 6 No current facility-administered medications for this visit. Past Medical History:   Diagnosis Date    COPD (chronic obstructive pulmonary disease) (HCC)     DJD (degenerative joint disease)     GERD (gastroesophageal reflux disease)     Hypertension     IBS (irritable bowel syndrome)     Insomnia     Osteoarthritis     Pap smear, as part of routine gynecological examination 3-2011    PPD positive 7/15/2015    Screening mammogram 3-2011    Sleep apnea       Past Surgical History:   Procedure Laterality Date    HEMORRHOID SURGERY      HYSTERECTOMY      partial    JOINT REPLACEMENT      Lt total knee    VOCAL CORD AUGMENTATION W/RADIESSE INJ  3-2011     Family History   Problem Relation Age of Onset    Diabetes Mother     High Blood Pressure Mother     High Blood Pressure Father     Diabetes Father     Stroke Sister     Diabetes Sister     High Blood Pressure Sister     Diabetes Brother     Cancer Brother      Social History     Tobacco Use    Smoking status: Former Smoker     Packs/day: 0.75     Years: 30.00     Pack years: 22.50     Types: Cigarettes     Start date: 1980     Quit date: 10/31/2010     Years since quitting: 10.2    Smokeless tobacco: Never Used   Substance Use Topics    Alcohol use: Yes     Comment: social       Subjective:      Review of Systems   Constitutional: Negative for activity change, appetite change, chills, diaphoresis, fatigue, fever and unexpected weight change. HENT: Negative for congestion, dental problem, ear discharge, ear pain, facial swelling, hearing loss, mouth sores, nosebleeds, postnasal drip, rhinorrhea, sinus pressure, sneezing, sore throat, tinnitus, trouble swallowing and voice change. Eyes: Negative for visual disturbance. Respiratory: Negative for apnea, cough, choking, chest tightness, shortness of breath, wheezing and stridor. Cardiovascular: Negative for chest pain, palpitations and leg swelling. Gastrointestinal: Negative for abdominal pain, diarrhea, nausea and vomiting. Endocrine: Negative for cold intolerance, heat intolerance, polydipsia and polyuria. Genitourinary: Negative for dysuria, enuresis and hematuria. Musculoskeletal: Negative for arthralgias, gait problem, neck pain and neck stiffness. Skin: Negative for color change and rash. Allergic/Immunologic: Negative for environmental allergies, food allergies and immunocompromised state. Neurological: Negative for dizziness, syncope, facial asymmetry, speech difficulty, light-headedness and headaches. Hematological: Negative for adenopathy. Does not bruise/bleed easily. Psychiatric/Behavioral: Negative for confusion and sleep disturbance. The patient is not nervous/anxious. Objective:   /80 (Site: Left Upper Arm, Position: Sitting)   Pulse 68   Temp 98.2 °F (36.8 °C) (Infrared)   Resp 16   Wt 192 lb (87.1 kg)   BMI 36.28 kg/m²     Physical Exam   Very clear voice. She did not clear her throat at all while in the office with me    Data:  All of the past medical history, past surgical history, family history,social history, allergies and current medications were reviewed with the patient. Assessment & Plan   Diagnoses and all orders for this visit:     Diagnosis Orders   1. Adverse effect of angiotensin 2 receptor antagonist, initial encounter      Improved off the medication   2. Chronic throat clearing      Improved       The findings were explained and her questions were answered. She has an excellent result from adjusting her medications and may return as needed       Mita Montez MD    **This report has been created using voice recognition software. It may contain minor errors which are inherent in voicerecognition technology. **

## 2021-01-29 ENCOUNTER — HOSPITAL ENCOUNTER (OUTPATIENT)
Dept: NON INVASIVE DIAGNOSTICS | Age: 76
Discharge: HOME | End: 2021-01-29
Payer: MEDICARE

## 2021-01-29 VITALS — BODY MASS INDEX: 37.38 KG/M2 | HEIGHT: 61 IN | WEIGHT: 198 LBS

## 2021-01-29 DIAGNOSIS — R06.02 SHORTNESS OF BREATH: ICD-10-CM

## 2021-01-29 PROCEDURE — 93017 CV STRESS TEST TRACING ONLY: CPT | Performed by: INTERNAL MEDICINE

## 2021-01-29 PROCEDURE — 3430000000 HC RX DIAGNOSTIC RADIOPHARMACEUTICAL: Performed by: INTERNAL MEDICINE

## 2021-01-29 PROCEDURE — 6360000002 HC RX W HCPCS

## 2021-01-29 PROCEDURE — 78452 HT MUSCLE IMAGE SPECT MULT: CPT

## 2021-01-29 PROCEDURE — A9500 TC99M SESTAMIBI: HCPCS | Performed by: INTERNAL MEDICINE

## 2021-01-29 RX ADMIN — Medication 32.7 MILLICURIE: at 10:32

## 2021-01-29 RX ADMIN — Medication 9.9 MILLICURIE: at 09:45

## 2021-08-30 ENCOUNTER — HOSPITAL ENCOUNTER (EMERGENCY)
Age: 76
Discharge: HOME | End: 2021-08-30
Payer: MEDICARE

## 2021-08-30 VITALS
OXYGEN SATURATION: 95 % | RESPIRATION RATE: 16 BRPM | WEIGHT: 195 LBS | BODY MASS INDEX: 36.84 KG/M2 | DIASTOLIC BLOOD PRESSURE: 82 MMHG | SYSTOLIC BLOOD PRESSURE: 170 MMHG | HEART RATE: 68 BPM | TEMPERATURE: 97.4 F

## 2021-08-30 DIAGNOSIS — H65.02 ACUTE SEROUS OTITIS MEDIA OF LEFT EAR, RECURRENCE NOT SPECIFIED: Primary | ICD-10-CM

## 2021-08-30 LAB — SARS-COV-2, NAA: NOT  DETECTED

## 2021-08-30 PROCEDURE — 87635 SARS-COV-2 COVID-19 AMP PRB: CPT

## 2021-08-30 PROCEDURE — 99213 OFFICE O/P EST LOW 20 MIN: CPT

## 2021-08-30 PROCEDURE — 99213 OFFICE O/P EST LOW 20 MIN: CPT | Performed by: NURSE PRACTITIONER

## 2021-08-30 RX ORDER — BENZONATATE 200 MG/1
200 CAPSULE ORAL 3 TIMES DAILY PRN
Qty: 21 CAPSULE | Refills: 0 | Status: SHIPPED | OUTPATIENT
Start: 2021-08-30 | End: 2021-09-06

## 2021-08-30 RX ORDER — AZITHROMYCIN 250 MG/1
TABLET, FILM COATED ORAL
Qty: 6 TABLET | Refills: 0 | Status: SHIPPED | OUTPATIENT
Start: 2021-08-30 | End: 2021-09-04

## 2021-08-30 RX ORDER — PREDNISONE 20 MG/1
20 TABLET ORAL DAILY
Qty: 5 TABLET | Refills: 0 | Status: SHIPPED | OUTPATIENT
Start: 2021-08-30 | End: 2021-09-04

## 2021-08-30 ASSESSMENT — ENCOUNTER SYMPTOMS
ABDOMINAL PAIN: 0
DIARRHEA: 0
SORE THROAT: 1
COUGH: 1
CONSTIPATION: 0
CHEST TIGHTNESS: 0
STRIDOR: 0
SWOLLEN GLANDS: 0
WHEEZING: 0
SINUS PAIN: 0
RHINORRHEA: 1
APNEA: 0
VOMITING: 0
SHORTNESS OF BREATH: 0
NAUSEA: 0
CHOKING: 0

## 2021-08-30 ASSESSMENT — PAIN DESCRIPTION - DESCRIPTORS: DESCRIPTORS: ACHING

## 2021-08-30 ASSESSMENT — PAIN DESCRIPTION - PROGRESSION: CLINICAL_PROGRESSION: GRADUALLY WORSENING

## 2021-08-30 ASSESSMENT — PAIN DESCRIPTION - LOCATION: LOCATION: THROAT

## 2021-08-30 ASSESSMENT — PAIN DESCRIPTION - FREQUENCY: FREQUENCY: CONTINUOUS

## 2021-08-30 ASSESSMENT — PAIN - FUNCTIONAL ASSESSMENT: PAIN_FUNCTIONAL_ASSESSMENT: ACTIVITIES ARE NOT PREVENTED

## 2021-08-30 ASSESSMENT — PAIN DESCRIPTION - PAIN TYPE: TYPE: ACUTE PAIN

## 2021-08-30 ASSESSMENT — PAIN SCALES - GENERAL: PAINLEVEL_OUTOF10: 7

## 2021-08-30 ASSESSMENT — PAIN DESCRIPTION - ONSET: ONSET: PROGRESSIVE

## 2021-08-30 NOTE — ED PROVIDER NOTES
[facility]  Urgent Care Encounter      CHIEF COMPLAINT       Chief Complaint   Patient presents with    Cough    Pharyngitis    Sweats       Nurses Notes reviewed and I agree except as noted in the HPI. HISTORY OFPRESENT Sergei Argueta is a 77 y.o. The history is provided by the patient. No  was used. URI  Presenting symptoms: congestion, cough, rhinorrhea and sore throat    Presenting symptoms: no ear pain, no facial pain, no fatigue and no fever    Severity:  Moderate  Onset quality:  Gradual  Duration:  4 days  Timing:  Constant  Progression:  Worsening  Chronicity:  New  Worsened by:  Certain positions  Ineffective treatments:  OTC medications  Associated symptoms: headaches    Associated symptoms: no arthralgias, no myalgias, no neck pain, no sinus pain, no sneezing, no swollen glands and no wheezing    Risk factors: chronic respiratory disease    Risk factors: not elderly, no chronic cardiac disease, no chronic kidney disease, no diabetes mellitus, no immunosuppression, no recent illness, no recent travel and no sick contacts        REVIEW OF SYSTEMS     Review of Systems   Constitutional: Negative for activity change, appetite change, chills, diaphoresis, fatigue and fever. HENT: Positive for congestion, rhinorrhea and sore throat. Negative for ear pain, postnasal drip, sinus pain and sneezing. Respiratory: Positive for cough. Negative for apnea, choking, chest tightness, shortness of breath, wheezing and stridor. Cardiovascular: Negative for chest pain, palpitations and leg swelling. Gastrointestinal: Negative for abdominal pain, constipation, diarrhea, nausea and vomiting. Musculoskeletal: Negative for arthralgias, myalgias and neck pain. Neurological: Positive for headaches. Negative for dizziness and light-headedness.        PAST MEDICAL HISTORY         Diagnosis Date    COPD [facility]chronic obstructive pulmonary disease) ([facility] [address]     EVELINAD (degenerative joint disease)     GERD (gastroesophageal reflux disease)     Hypertension     IBS (irritable bowel syndrome)     Insomnia     Osteoarthritis     Pap smear, as part of routine gynecological examination 3-2011    PPD positive 7/15/2015    Screening mammogram 3-2011    Sleep apnea        SURGICAL HISTORY     Patient  has a past surgical history that includes Hemorrhoid surgery; joint replacement; Hysterectomy; and Vocal Cord Augmentation W/Radiesse Inj (3-2011). CURRENT MEDICATIONS       Previous Medications    ACETAMINOPHEN 650 MG TABS    Take 650 mg by mouth every 4 hours as needed    AMITIZA 24 MCG CAPSULE    TK 1 C PO BID WF    AMLODIPINE (NORVASC) 2.5 MG TABLET    Take 2.5 mg by mouth daily    ASCORBIC ACID (VITAMIN C PO)    Take by mouth    CETIRIZINE HCL (ZYRTEC ALLERGY PO)    Take by mouth    FLUTICASONE (FLONASE) 50 MCG/ACT NASAL SPRAY    1 spray by Each Nostril route daily    HYDROCODONE-ACETAMINOPHEN (NORCO) 5-325 MG PER TABLET    TK 1 T PO  Q 8 H  PRN    LEVOTHYROXINE (SYNTHROID) 25 MCG TABLET    TAKE 1 TABLET BY MOUTH DAILY    MISC NATURAL PRODUCTS (AM/PM MENOPAUSE FORMULA PO)    Take by mouth    PANTOPRAZOLE (PROTONIX) 40 MG TABLET    TAKE 1 TABLET BY MOUTH DAILY. TIOTROPIUM BROMIDE-OLODATEROL 2.5-2.5 MCG/ACT AERS    Inhale 2 puffs into the lungs daily    TRIAMTERENE-HYDROCHLOROTHIAZIDE (MAXZIDE-25) 37.5-25 MG PER TABLET    TK 1 T PO QD IN THE MORNING    UNABLE TO FIND    amitiza       ALLERGIES     Patient is is allergic to aleve [naproxen sodium] and lisinopril. FAMILY HISTORY     Patient's family history includes Cancer in her brother; Diabetes in her brother, father, mother, and sister; High Blood Pressure in her father, mother, and sister; Stroke in her sister. SOCIAL HISTORY     Patient  reports that she quit smoking about 10 years ago. Her smoking use included cigarettes. She started smoking about 41 years ago. She has a 22.50 pack-year smoking history.  She has never used smokeless tobacco. She reports current alcohol use. She reports that she does not use drugs. PHYSICAL EXAM     ED TRIAGE VITALS  BP: (!) 170/82, Temp: 97.4 °F (36.3 °C), Pulse: 68, Resp: 16, SpO2: 95 %  Physical Exam  Vitals and nursing note reviewed. Constitutional:       General: She is not in acute distress. Appearance: She is well-developed. She is not ill-appearing, toxic-appearing or diaphoretic. HENT:      Head: Normocephalic and atraumatic. Right Ear: Ear canal normal. Tympanic membrane is erythematous. Left Ear: Ear canal normal. Tympanic membrane is erythematous. Mouth/Throat:      Mouth: Mucous membranes are moist. No oral lesions. Pharynx: Uvula midline. No pharyngeal swelling, oropharyngeal exudate, posterior oropharyngeal erythema or uvula swelling. Tonsils: No tonsillar exudate or tonsillar abscesses. Eyes:      Conjunctiva/sclera: Conjunctivae normal.   Pulmonary:      Effort: Pulmonary effort is normal. No respiratory distress. Breath sounds: Normal breath sounds. No stridor. No wheezing, rhonchi or rales. Chest:      Chest wall: No tenderness. Musculoskeletal:      Cervical back: Normal range of motion. Skin:     General: Skin is warm. Neurological:      General: No focal deficit present. Mental Status: She is alert and oriented to person, place, and time.    Psychiatric:         Mood and Affect: Mood normal.         Behavior: Behavior normal.         DIAGNOSTIC RESULTS   Labs:  Results for orders placed or performed during the hospital encounter of 08/30/21   COVID-19, Rapid   Result Value Ref Range    SARS-CoV-2, BAKARI NOT  DETECTED NOT DETECTED       IMAGING:  No orders to display     URGENT CARE COURSE:     Vitals:    08/30/21 1515 08/30/21 1517   BP:  (!) 170/82   Pulse:  68   Resp:  16   Temp:  97.4 °F (36.3 °C)   TempSrc:  Temporal   SpO2:  95%   Weight: 195 lb (88.5 kg)        Medications - No data to display  PROCEDURES:  None  FINAL IMPRESSION      1. Acute serous otitis media of left ear, recurrence not specified        DISPOSITION/PLAN      The parent or patient representative was advised that at this point the patient can be treated safely at home, the parent or Patient representative should be aware of following interventions and  advised to the watch for the following:  #1. Any increasing pain not controlled with Motrin or Tylenol. #2. Any development of drainage from the ears redness of the auricle or posterior ear. #3.  Her development of the any fever chills, headache or stiffness of the neck the patient needs to be reevaluated by the primary care provider, return here or go to the emergency department for reevaluation. The patient or patient's representative are agreeable to the outpatient management at this time. They are advised to follow-up with her primary care provider in 2-3 days for reevaluation. The patient left ambulatory without any problems. PATIENT REFERRED TO:  Luis Manuel Sam MD  [address] [place] [address]  [phone]    Schedule an appointment as soon as possible for a visit in 1 week  For re-check    DISCHARGE MEDICATIONS:  New Prescriptions    AZITHROMYCIN (ZITHROMAX) 250 MG TABLET    Take 2 tablets by mouth daily for 1 day, THEN 1 tablet daily for 4 days.     BENZONATATE (TESSALON) 200 MG CAPSULE    Take 1 capsule by mouth 3 times daily as needed for Cough    PREDNISONE (DELTASONE) 20 MG TABLET    Take 1 tablet by mouth daily for 5 days     Current Discharge Medication List          ROSELIA Flores CNP, APRN - CNP  08/30/21 3399

## 2021-11-09 ENCOUNTER — TELEPHONE (OUTPATIENT)
Dept: PULMONOLOGY | Age: 76
End: 2021-11-09

## 2021-11-09 NOTE — TELEPHONE ENCOUNTER
Pt called back regarding to someone calling her to switch to Erika hSetty states she wants to stay with [facility]ero 11/9/21

## 2021-12-01 ENCOUNTER — HOSPITAL ENCOUNTER (OUTPATIENT)
Dept: CT IMAGING | Age: 76
Discharge: HOME | End: 2021-12-01
Payer: MEDICARE

## 2021-12-01 DIAGNOSIS — Z87.891 PERSONAL HISTORY OF TOBACCO USE, PRESENTING HAZARDS TO HEALTH: ICD-10-CM

## 2021-12-01 DIAGNOSIS — J44.9 MODERATE COPD (CHRONIC OBSTRUCTIVE PULMONARY DISEASE) (HCC): ICD-10-CM

## 2021-12-01 DIAGNOSIS — Z72.0 TOBACCO ABUSE: ICD-10-CM

## 2021-12-01 PROCEDURE — 71271 CT THORAX LUNG CANCER SCR C-: CPT

## 2021-12-27 ENCOUNTER — TELEPHONE (OUTPATIENT)
Dept: PULMONOLOGY | Age: 76
End: 2021-12-27

## 2021-12-27 NOTE — TELEPHONE ENCOUNTER
Attempted to call patient to get her scheduled for a follow up for her ORESTES. I left a message for her to call back.

## 2023-01-03 ENCOUNTER — HOSPITAL ENCOUNTER (OUTPATIENT)
Dept: PREADMISSION TESTING | Age: 78
Discharge: HOME | End: 2023-01-03

## 2023-01-06 ENCOUNTER — HOSPITAL ENCOUNTER (EMERGENCY)
Age: 78
Discharge: ANOTHER HOSPITAL | End: 2023-01-06
Payer: MEDICARE

## 2023-01-06 VITALS
HEART RATE: 64 BPM | BODY MASS INDEX: 35.71 KG/M2 | SYSTOLIC BLOOD PRESSURE: 141 MMHG | WEIGHT: 189 LBS | RESPIRATION RATE: 16 BRPM | OXYGEN SATURATION: 99 % | TEMPERATURE: 98.5 F | DIASTOLIC BLOOD PRESSURE: 80 MMHG

## 2023-01-06 DIAGNOSIS — M79.661 PAIN AND SWELLING OF RIGHT LOWER LEG: Primary | ICD-10-CM

## 2023-01-06 DIAGNOSIS — M79.89 PAIN AND SWELLING OF RIGHT LOWER LEG: Primary | ICD-10-CM

## 2023-01-06 PROCEDURE — 99215 OFFICE O/P EST HI 40 MIN: CPT

## 2023-01-06 ASSESSMENT — PAIN DESCRIPTION - ORIENTATION: ORIENTATION: RIGHT;LOWER

## 2023-01-06 ASSESSMENT — PAIN DESCRIPTION - LOCATION: LOCATION: LEG

## 2023-01-06 ASSESSMENT — PAIN - FUNCTIONAL ASSESSMENT
PAIN_FUNCTIONAL_ASSESSMENT: PREVENTS OR INTERFERES WITH MANY ACTIVE NOT PASSIVE ACTIVITIES
PAIN_FUNCTIONAL_ASSESSMENT: 0-10

## 2023-01-06 ASSESSMENT — PAIN DESCRIPTION - DESCRIPTORS: DESCRIPTORS: ACHING;PRESSURE

## 2023-01-06 ASSESSMENT — PAIN SCALES - GENERAL: PAINLEVEL_OUTOF10: 7

## 2023-01-06 ASSESSMENT — PAIN DESCRIPTION - ONSET: ONSET: PROGRESSIVE

## 2023-01-06 ASSESSMENT — PAIN DESCRIPTION - FREQUENCY: FREQUENCY: CONTINUOUS

## 2023-01-06 ASSESSMENT — ENCOUNTER SYMPTOMS: SHORTNESS OF BREATH: 0

## 2023-01-06 ASSESSMENT — PAIN DESCRIPTION - PAIN TYPE: TYPE: ACUTE PAIN

## 2023-01-06 NOTE — ED PROVIDER NOTES
[facility]      CHIEF COMPLAINT       Chief Complaint   Patient presents with    Leg Swelling     Right lower leg, swelling, bruising and painful and warm         Nurses Notes reviewed and I agree except as noted in the HPI. HISTORY OF PRESENT ILLNESS   Car Narvaez is a 69 y.o. female who presents to urgent care for evaluation of some swelling and pain in the right lower leg. Patient had right knee replacement in 2015. She does have a cyst in the posterior right knee that is supposed to be drained on 1/17. She noticed the discomfort and swelling a couple of days ago, but this morning feels it is even getting worse. There is some bruising as well. She has no history of blood clots, she does not take any blood thinners. She denies any chest pain or shortness of breath. No recent travel. REVIEW OF SYSTEMS     Review of Systems   Respiratory:  Negative for shortness of breath. Cardiovascular:  Negative for chest pain. Musculoskeletal:  Positive for myalgias (right lower leg swelling). All other systems reviewed and are negative. PAST MEDICAL HISTORY         Diagnosis Date    COPD (chronic obstructive pulmonary disease) (HCC)     DJD (degenerative joint disease)     GERD (gastroesophageal reflux disease)     Hypertension     IBS (irritable bowel syndrome)     Insomnia     Osteoarthritis     Pap smear, as part of routine gynecological examination 3-2011    PPD positive 7/15/2015    Screening mammogram 3-2011    Sleep apnea        SURGICAL HISTORY     Patient  has a past surgical history that includes Hemorrhoid surgery; joint replacement; Hysterectomy; and Vocal Cord Augmentation W/Radiesse Inj (3-2011).     CURRENT MEDICATIONS       Previous Medications    ACETAMINOPHEN 650 MG TABS    Take 650 mg by mouth every 4 hours as needed    AMITIZA 24 MCG CAPSULE    TK 1 C PO BID WF    AMLODIPINE (NORVASC) 2.5 MG TABLET    Take 2.5 mg by mouth daily ASCORBIC ACID (VITAMIN C PO)    Take by mouth    CETIRIZINE HCL (ZYRTEC ALLERGY PO)    Take by mouth    FLUTICASONE (FLONASE) 50 MCG/ACT NASAL SPRAY    1 spray by Each Nostril route daily    HYDROCODONE-ACETAMINOPHEN (NORCO) 5-325 MG PER TABLET    TK 1 T PO  Q 8 H  PRN    LEVOTHYROXINE (SYNTHROID) 25 MCG TABLET    TAKE 1 TABLET BY MOUTH DAILY    MISC NATURAL PRODUCTS (AM/PM MENOPAUSE FORMULA PO)    Take by mouth    PANTOPRAZOLE (PROTONIX) 40 MG TABLET    TAKE 1 TABLET BY MOUTH DAILY. TRIAMTERENE-HYDROCHLOROTHIAZIDE (MAXZIDE-25) 37.5-25 MG PER TABLET    TK 1 T PO QD IN THE MORNING    UNABLE TO FIND    amitiza       ALLERGIES     Patient is is allergic to aleve [naproxen sodium] and lisinopril. FAMILY HISTORY     Patient'sfamily history includes Cancer in her brother; Diabetes in her brother, father, mother, and sister; High Blood Pressure in her father, mother, and sister; Stroke in her sister. SOCIAL HISTORY     Patient  reports that she quit smoking about 12 years ago. Her smoking use included cigarettes. She started smoking about 43 years ago. She has a 22.50 pack-year smoking history. She has never used smokeless tobacco. She reports current alcohol use. She reports that she does not use drugs. PHYSICAL EXAM     ED TRIAGE VITALS  BP: (!) 141/80, Temp: 98.5 °F (36.9 °C), Heart Rate: 64, Resp: 16, SpO2: 99 %  Physical Exam  Vitals reviewed. Constitutional:       General: She is not in acute distress. Appearance: She is well-developed. HENT:      Head: Normocephalic. Right Ear: External ear normal.      Left Ear: External ear normal.      Nose: Nose normal.      Mouth/Throat:      Pharynx: No oropharyngeal exudate. Eyes:      Conjunctiva/sclera: Conjunctivae normal.      Pupils: Pupils are equal, round, and reactive to light. Neck:      Thyroid: No thyromegaly. Cardiovascular:      Rate and Rhythm: Normal rate and regular rhythm.       Pulses:           Dorsalis pedis pulses are 2+ on the right side. Posterior tibial pulses are 1+ on the right side. Heart sounds: Normal heart sounds. No murmur heard. No friction rub. No gallop. Comments: Right lower leg 15.5 inches circumference compared to left lower leg left lower leg 14.75. Distal right leg is taut with 1+ pitting edema and visible ecchymosis medially. Pulmonary:      Effort: Pulmonary effort is normal. No respiratory distress. Breath sounds: Normal breath sounds. No wheezing or rales. Abdominal:      General: There is no distension. Palpations: Abdomen is soft. There is no mass. Tenderness: There is no abdominal tenderness. There is no guarding. Musculoskeletal:         General: Normal range of motion. Cervical back: Normal range of motion and neck supple. Right lower le+ Edema present. Lymphadenopathy:      Cervical: No cervical adenopathy. Skin:     General: Skin is warm and dry. Capillary Refill: Capillary refill takes less than 2 seconds. Neurological:      Mental Status: She is alert and oriented to person, place, and time. Psychiatric:         Behavior: Behavior normal.         Thought Content: Thought content normal.         Judgment: Judgment normal.       DIAGNOSTIC RESULTS   Labs:No results found for this visit on 23. IMAGING:  No orders to display      URGENT CARE COURSE:     Vitals:    23 1208 23 1213   BP:  (!) 141/80   Pulse:  64   Resp:  16   Temp:  98.5 °F (36.9 °C)   TempSrc:  Temporal   SpO2:  99%   Weight: 189 lb (85.7 kg)        Medications - No data to display  PROCEDURES:  None  FINAL IMPRESSION      1. Pain and swelling of right lower leg        DISPOSITION/PLAN   DISPOSITION Decision To Transfer 2023 12:27:05 PM    Discussed with patient recommendations to be further evaluated the emergency department to rule out DVT. Patient is in agreement and wishes to go to [facility] per private vehicle, will go immediately. Patient is in no acute distress. Vital signs stable, no shortness of breath, no chest pain. Report given to Ray Tello in [facility] ER, accepting physician is Dr Adan Kevin.        PATIENT REFERRED TO:  Mikala Lee MD  [address] [place] [address]  [phone]    In 1 week  As needed  DISCHARGE MEDICATIONS:  New Prescriptions    No medications on file     Current Discharge Medication List          ROSELIA Thacker CNP, APRN - CNP  01/06/23 7807

## 2023-01-06 NOTE — ED TRIAGE NOTES
Pt to room 2 with c/o right lower leg swelling and pain x 1 week. She noticed today that it is warm and is seeing some bruising.      Measurements taken:       Right leg calf largest = 15.5 \"     2 \" below  = 12.5 \"        Left leg calf, largest =14.75 \"  2 \"below=  + 12\"

## 2023-01-06 NOTE — ED TRIAGE NOTES
Pt to room 2 with c/o right leg swelling with pain x 1 week and noticed today that there is bruising and she states it is feeling warm.

## 2023-08-30 NOTE — PROGRESS NOTES
PAT Appointment reminder call given  Date: 9/6  Arrival time: 10:30 and location; [facility]   Bring Drivers license and insurance  Bring Medications in original bottles  If possible bring caregiver for appointment  Take am medications with water unless you are holding any for surgery  Appointment may last 2 hours

## 2023-09-06 ENCOUNTER — HOSPITAL ENCOUNTER (OUTPATIENT)
Dept: PREADMISSION TESTING | Age: 78
Discharge: HOME | End: 2023-09-06
Payer: COMMERCIAL

## 2023-09-06 ENCOUNTER — HOSPITAL ENCOUNTER (OUTPATIENT)
Dept: GENERAL RADIOLOGY | Age: 78
Discharge: HOME | End: 2023-09-06
Payer: COMMERCIAL

## 2023-09-06 VITALS
TEMPERATURE: 98.8 F | BODY MASS INDEX: 32.38 KG/M2 | HEART RATE: 52 BPM | RESPIRATION RATE: 16 BRPM | HEIGHT: 61 IN | DIASTOLIC BLOOD PRESSURE: 92 MMHG | OXYGEN SATURATION: 94 % | WEIGHT: 171.52 LBS | SYSTOLIC BLOOD PRESSURE: 156 MMHG

## 2023-09-06 DIAGNOSIS — Z01.818 PREOP EXAMINATION: ICD-10-CM

## 2023-09-06 LAB
ANION GAP SERPL CALC-SCNC: 10 MEQ/L (ref 8–16)
BACTERIA URNS QL MICRO: ABNORMAL /HPF
BASOPHILS ABSOLUTE: 0.1 THOU/MM3 (ref 0–0.1)
BASOPHILS NFR BLD AUTO: 0.9 %
BILIRUB UR QL STRIP.AUTO: NEGATIVE
BUN SERPL-MCNC: 9 MG/DL (ref 7–22)
CALCIUM SERPL-MCNC: 9.5 MG/DL (ref 8.5–10.5)
CASTS #/AREA URNS LPF: ABNORMAL /LPF
CASTS 2: ABNORMAL /LPF
CHARACTER UR: CLEAR
CHLORIDE SERPL-SCNC: 104 MEQ/L (ref 98–111)
CO2 SERPL-SCNC: 28 MEQ/L (ref 23–33)
COLOR: YELLOW
CREAT SERPL-MCNC: 1 MG/DL (ref 0.4–1.2)
CRP SERPL-MCNC: 0.42 MG/DL (ref 0–1)
CRYSTALS URNS MICRO: ABNORMAL
DEPRECATED RDW RBC AUTO: 45.1 FL (ref 35–45)
EOSINOPHIL NFR BLD AUTO: 3.6 %
EOSINOPHILS ABSOLUTE: 0.3 THOU/MM3 (ref 0–0.4)
EPITHELIAL CELLS, UA: ABNORMAL /HPF
ERYTHROCYTE [DISTWIDTH] IN BLOOD BY AUTOMATED COUNT: 13.2 % (ref 11.5–14.5)
ERYTHROCYTE [SEDIMENTATION RATE] IN BLOOD BY WESTERGREN METHOD: 9 MM/HR (ref 0–20)
GFR SERPL CREATININE-BSD FRML MDRD: 58 ML/MIN/1.73M2
GLUCOSE SERPL-MCNC: 101 MG/DL (ref 70–108)
GLUCOSE UR QL STRIP.AUTO: NEGATIVE MG/DL
HCT VFR BLD AUTO: 39.6 % (ref 37–47)
HGB BLD-MCNC: 12.9 GM/DL (ref 12–16)
HGB UR QL STRIP.AUTO: ABNORMAL
IMM GRANULOCYTES # BLD AUTO: 0.02 THOU/MM3 (ref 0–0.07)
IMM GRANULOCYTES NFR BLD AUTO: 0.3 %
KETONES UR QL STRIP.AUTO: NEGATIVE
LYMPHOCYTES ABSOLUTE: 1.8 THOU/MM3 (ref 1–4.8)
LYMPHOCYTES NFR BLD AUTO: 24.2 %
MCH RBC QN AUTO: 30.6 PG (ref 26–33)
MCHC RBC AUTO-ENTMCNC: 32.6 GM/DL (ref 32.2–35.5)
MCV RBC AUTO: 93.8 FL (ref 81–99)
MISCELLANEOUS 2: ABNORMAL
MONOCYTES ABSOLUTE: 0.5 THOU/MM3 (ref 0.4–1.3)
MONOCYTES NFR BLD AUTO: 6.5 %
MRSA DNA SPEC QL NAA+PROBE: NEGATIVE
NEUTROPHILS NFR BLD AUTO: 64.5 %
NITRITE UR QL STRIP: NEGATIVE
NRBC BLD AUTO-RTO: 0 /100 WBC
PH UR STRIP.AUTO: 8 [PH] (ref 5–9)
PLATELET # BLD AUTO: 193 THOU/MM3 (ref 130–400)
PMV BLD AUTO: 10.9 FL (ref 9.4–12.4)
POTASSIUM SERPL-SCNC: 3.6 MEQ/L (ref 3.5–5.2)
PROT UR STRIP.AUTO-MCNC: NEGATIVE MG/DL
RBC # BLD AUTO: 4.22 MILL/MM3 (ref 4.2–5.4)
RBC URINE: ABNORMAL /HPF
RENAL EPI CELLS #/AREA URNS HPF: ABNORMAL /[HPF]
S AUREUS DNA SPEC QL NAA+PROBE: NEGATIVE
SEGMENTED NEUTROPHILS ABSOLUTE COUNT: 4.8 THOU/MM3 (ref 1.8–7.7)
SODIUM SERPL-SCNC: 142 MEQ/L (ref 135–145)
SP GR UR REFRACT.AUTO: 1.01 (ref 1–1.03)
UROBILINOGEN, URINE: 1 EU/DL (ref 0–1)
WBC # BLD AUTO: 7.4 THOU/MM3 (ref 4.8–10.8)
WBC #/AREA URNS HPF: ABNORMAL /HPF
WBC #/AREA URNS HPF: ABNORMAL /[HPF]
YEAST LIKE FUNGI URNS QL MICRO: ABNORMAL

## 2023-09-06 PROCEDURE — 85651 RBC SED RATE NONAUTOMATED: CPT

## 2023-09-06 PROCEDURE — 93010 ELECTROCARDIOGRAM REPORT: CPT | Performed by: INTERNAL MEDICINE

## 2023-09-06 PROCEDURE — 80048 BASIC METABOLIC PNL TOTAL CA: CPT

## 2023-09-06 PROCEDURE — 81001 URINALYSIS AUTO W/SCOPE: CPT

## 2023-09-06 PROCEDURE — 85025 COMPLETE CBC W/AUTO DIFF WBC: CPT

## 2023-09-06 PROCEDURE — 86140 C-REACTIVE PROTEIN: CPT

## 2023-09-06 PROCEDURE — 87640 STAPH A DNA AMP PROBE: CPT

## 2023-09-06 PROCEDURE — 36415 COLL VENOUS BLD VENIPUNCTURE: CPT

## 2023-09-06 PROCEDURE — 87641 MR-STAPH DNA AMP PROBE: CPT

## 2023-09-06 PROCEDURE — 71046 X-RAY EXAM CHEST 2 VIEWS: CPT

## 2023-09-06 PROCEDURE — 93005 ELECTROCARDIOGRAM TRACING: CPT | Performed by: PHYSICIAN ASSISTANT

## 2023-09-06 RX ORDER — ALBUTEROL SULFATE 0.63 MG/3ML
1 SOLUTION RESPIRATORY (INHALATION) EVERY 6 HOURS PRN
COMMUNITY

## 2023-09-06 RX ORDER — NITROGLYCERIN 0.4 MG/1
TABLET SUBLINGUAL
COMMUNITY
Start: 2023-07-12

## 2023-09-06 RX ORDER — TRAZODONE HYDROCHLORIDE 150 MG/1
150 TABLET ORAL NIGHTLY
COMMUNITY
Start: 2023-09-01

## 2023-09-06 ASSESSMENT — PAIN SCALES - GENERAL: PAINLEVEL_OUTOF10: 10

## 2023-09-06 ASSESSMENT — PAIN DESCRIPTION - ONSET: ONSET: PROGRESSIVE

## 2023-09-06 ASSESSMENT — PAIN DESCRIPTION - DESCRIPTORS: DESCRIPTORS: ACHING

## 2023-09-06 ASSESSMENT — PAIN DESCRIPTION - PAIN TYPE: TYPE: CHRONIC PAIN

## 2023-09-06 ASSESSMENT — PAIN DESCRIPTION - ORIENTATION: ORIENTATION: RIGHT

## 2023-09-06 ASSESSMENT — PAIN - FUNCTIONAL ASSESSMENT: PAIN_FUNCTIONAL_ASSESSMENT: PREVENTS OR INTERFERES WITH MANY ACTIVE NOT PASSIVE ACTIVITIES

## 2023-09-06 ASSESSMENT — PAIN DESCRIPTION - LOCATION: LOCATION: KNEE

## 2023-09-06 NOTE — PROGRESS NOTES
Preliminary Discharge Planning Questionnaire  Date of Surgery 9/19/23   Surgeon Nathan Ayala      Having the proper help and care after surgery is very important to your recovery. Who will be able to help you at home when you are discharged from the hospital?    niece   Name(s) Jamin Alejo  Also has son living with her that will help    How many steps to enter your home? 4    Bathroom on first floor? Yes    Bedroom on the first floor? Yes    Do you have an elevated toilet seat to use at home? Yes    Do you have a walker to use at home? Total Joints - with wheels Yes   Spine - with wheels  N/A     Have you been doing home exercises? *You will go home with some outpatient physical therapy, where do you prefer to go? She prefers to go through [facility]     *If needed, what home health agency would you like to use? Preference?   [facility]    *Cardiac Rehab plans NA

## 2023-09-06 NOTE — PROGRESS NOTES
SURGERY PREPARATION CHECKLIST     NAME: _______Lolis Cisneros_____________________     DATE OF SURGERY: ______9/19/23_______________    Enter Dates, Check (?) circles to indicate task is completed. Date MUPIROCIN NASAL OINTMENT BODY CLEANSING     DAY 1 ______9/14/23___________   Morning    Evening          Day 2 _____9/15/23____________   Morning     Evening        Day 3 _____9/16/23____________   Morning  Evening        Day 4 _____9/17/23_____________   Morning    Evening        Day 5 _____9/18/23_____________   Morning  Evening        Day 6 ______9/19/23____________  (Day of Surgery)               PLEASE COMPLETE and BRING THIS CHECKLIST WITH YOU TO Charley Chavez to give to your nurse on the day of surgery. You will be notified if you need to use Mupirocin Nasal Ointment.

## 2023-09-06 NOTE — PROGRESS NOTES
PAIN:  Lolis describes her pain as 10/10 on pain scale when she walks. Achy pain to right knee, however she also has pain to left knee. Pt c/o low back pain that causes severe burning sensation to buttocks and back of legs bilat. Pain scale and pain management discussed with patient and understanding verbalized.

## 2023-09-08 LAB
EKG ATRIAL RATE: 49 BPM
EKG P AXIS: 27 DEGREES
EKG P-R INTERVAL: 134 MS
EKG Q-T INTERVAL: 428 MS
EKG QRS DURATION: 88 MS
EKG QTC CALCULATION (BAZETT): 386 MS
EKG R AXIS: -33 DEGREES
EKG T AXIS: 4 DEGREES
EKG VENTRICULAR RATE: 49 BPM

## 2023-09-14 ENCOUNTER — TRANSCRIBE ORDERS (OUTPATIENT)
Dept: ADMINISTRATIVE | Age: 78
End: 2023-09-14

## 2023-09-14 DIAGNOSIS — R10.32 ABDOMINAL PAIN, LEFT LOWER QUADRANT: Primary | ICD-10-CM

## 2023-09-15 ENCOUNTER — HOSPITAL ENCOUNTER (OUTPATIENT)
Dept: CT IMAGING | Age: 78
Discharge: HOME | End: 2023-09-15
Attending: INTERNAL MEDICINE
Payer: COMMERCIAL

## 2023-09-15 DIAGNOSIS — R10.32 ABDOMINAL PAIN, LEFT LOWER QUADRANT: ICD-10-CM

## 2023-09-15 PROCEDURE — 74176 CT ABD & PELVIS W/O CONTRAST: CPT

## 2023-09-19 ENCOUNTER — HOSPITAL ENCOUNTER (INPATIENT)
Age: 78
LOS: 3 days | Discharge: HOME HEALTH | DRG: 468 | End: 2023-09-22
Attending: ORTHOPAEDIC SURGERY | Admitting: ORTHOPAEDIC SURGERY
Payer: COMMERCIAL

## 2023-09-19 ENCOUNTER — ANESTHESIA EVENT (OUTPATIENT)
Dept: OPERATING ROOM | Age: 78
End: 2023-09-19
Payer: COMMERCIAL

## 2023-09-19 ENCOUNTER — ANESTHESIA (OUTPATIENT)
Dept: OPERATING ROOM | Age: 78
End: 2023-09-19
Payer: COMMERCIAL

## 2023-09-19 ENCOUNTER — APPOINTMENT (OUTPATIENT)
Dept: GENERAL RADIOLOGY | Age: 78
DRG: 468 | End: 2023-09-19
Attending: ORTHOPAEDIC SURGERY
Payer: COMMERCIAL

## 2023-09-19 DIAGNOSIS — M25.361 UNSTABLE RIGHT KNEE: ICD-10-CM

## 2023-09-19 DIAGNOSIS — I10 HYPERTENSION, UNSPECIFIED TYPE: Primary | ICD-10-CM

## 2023-09-19 DIAGNOSIS — M71.20 SYNOVIAL CYST OF POPLITEAL SPACE, UNSPECIFIED LATERALITY: ICD-10-CM

## 2023-09-19 PROBLEM — T84.012A FAILED TOTAL RIGHT KNEE REPLACEMENT, INITIAL ENCOUNTER (HCC): Status: ACTIVE | Noted: 2023-09-19

## 2023-09-19 PROBLEM — T84.012A FAILED TOTAL KNEE, RIGHT (HCC): Status: ACTIVE | Noted: 2023-09-19

## 2023-09-19 PROBLEM — Z98.890 POST-OPERATIVE STATE: Status: ACTIVE | Noted: 2023-09-19

## 2023-09-19 LAB
ABO: NORMAL
ANTIBODY SCREEN: NORMAL
CHARACTER SNV: ABNORMAL
COLOR SNV: ABNORMAL
CRYSTALS FLD MICRO: ABNORMAL
FUNGUS SPEC FUNGUS STN: NORMAL
GRANULOCYTES NFR FLD AUTO: 12 % (ref 0–24)
MONONUC CELLS NFR FLD AUTO: 88 % (ref 0–74)
NUC CELL # FLD AUTO: 261 /CUMM (ref 0–150)
PATHOLOGIST REVIEW: ABNORMAL
RH FACTOR: NORMAL
TOTAL VOLUME RECEIVED SYNOVIAL: 9 ML

## 2023-09-19 PROCEDURE — 6360000002 HC RX W HCPCS: Performed by: ANESTHESIOLOGY

## 2023-09-19 PROCEDURE — 86900 BLOOD TYPING SEROLOGIC ABO: CPT

## 2023-09-19 PROCEDURE — 51701 INSERT BLADDER CATHETER: CPT

## 2023-09-19 PROCEDURE — 2720000010 HC SURG SUPPLY STERILE: Performed by: ORTHOPAEDIC SURGERY

## 2023-09-19 PROCEDURE — 7100000011 HC PHASE II RECOVERY - ADDTL 15 MIN: Performed by: ORTHOPAEDIC SURGERY

## 2023-09-19 PROCEDURE — 2580000003 HC RX 258: Performed by: ORTHOPAEDIC SURGERY

## 2023-09-19 PROCEDURE — 2500000003 HC RX 250 WO HCPCS: Performed by: ORTHOPAEDIC SURGERY

## 2023-09-19 PROCEDURE — 3700000001 HC ADD 15 MINUTES (ANESTHESIA): Performed by: ORTHOPAEDIC SURGERY

## 2023-09-19 PROCEDURE — 0S9C0ZZ DRAINAGE OF RIGHT KNEE JOINT, OPEN APPROACH: ICD-10-PCS | Performed by: ORTHOPAEDIC SURGERY

## 2023-09-19 PROCEDURE — C1713 ANCHOR/SCREW BN/BN,TIS/BN: HCPCS | Performed by: ORTHOPAEDIC SURGERY

## 2023-09-19 PROCEDURE — 87102 FUNGUS ISOLATION CULTURE: CPT

## 2023-09-19 PROCEDURE — 6360000002 HC RX W HCPCS: Performed by: NURSE ANESTHETIST, CERTIFIED REGISTERED

## 2023-09-19 PROCEDURE — 89060 EXAM SYNOVIAL FLUID CRYSTALS: CPT

## 2023-09-19 PROCEDURE — 6360000002 HC RX W HCPCS: Performed by: ORTHOPAEDIC SURGERY

## 2023-09-19 PROCEDURE — 51798 US URINE CAPACITY MEASURE: CPT

## 2023-09-19 PROCEDURE — C1776 JOINT DEVICE (IMPLANTABLE): HCPCS | Performed by: ORTHOPAEDIC SURGERY

## 2023-09-19 PROCEDURE — 0SPC0JZ REMOVAL OF SYNTHETIC SUBSTITUTE FROM RIGHT KNEE JOINT, OPEN APPROACH: ICD-10-PCS | Performed by: ORTHOPAEDIC SURGERY

## 2023-09-19 PROCEDURE — 36415 COLL VENOUS BLD VENIPUNCTURE: CPT

## 2023-09-19 PROCEDURE — 7100000000 HC PACU RECOVERY - FIRST 15 MIN: Performed by: ORTHOPAEDIC SURGERY

## 2023-09-19 PROCEDURE — 6370000000 HC RX 637 (ALT 250 FOR IP): Performed by: ORTHOPAEDIC SURGERY

## 2023-09-19 PROCEDURE — 88331 PATH CONSLTJ SURG 1 BLK 1SPC: CPT

## 2023-09-19 PROCEDURE — 3600000014 HC SURGERY LEVEL 4 ADDTL 15MIN: Performed by: ORTHOPAEDIC SURGERY

## 2023-09-19 PROCEDURE — 1200000000 HC SEMI PRIVATE

## 2023-09-19 PROCEDURE — 2709999900 HC NON-CHARGEABLE SUPPLY: Performed by: ORTHOPAEDIC SURGERY

## 2023-09-19 PROCEDURE — 6360000002 HC RX W HCPCS

## 2023-09-19 PROCEDURE — 87070 CULTURE OTHR SPECIMN AEROBIC: CPT

## 2023-09-19 PROCEDURE — 64447 NJX AA&/STRD FEMORAL NRV IMG: CPT | Performed by: ANESTHESIOLOGY

## 2023-09-19 PROCEDURE — 2500000003 HC RX 250 WO HCPCS: Performed by: NURSE ANESTHETIST, CERTIFIED REGISTERED

## 2023-09-19 PROCEDURE — 7100000010 HC PHASE II RECOVERY - FIRST 15 MIN: Performed by: ORTHOPAEDIC SURGERY

## 2023-09-19 PROCEDURE — 87205 SMEAR GRAM STAIN: CPT

## 2023-09-19 PROCEDURE — 88304 TISSUE EXAM BY PATHOLOGIST: CPT

## 2023-09-19 PROCEDURE — 86901 BLOOD TYPING SEROLOGIC RH(D): CPT

## 2023-09-19 PROCEDURE — 3E0T3BZ INTRODUCTION OF ANESTHETIC AGENT INTO PERIPHERAL NERVES AND PLEXI, PERCUTANEOUS APPROACH: ICD-10-PCS | Performed by: ANESTHESIOLOGY

## 2023-09-19 PROCEDURE — 89050 BODY FLUID CELL COUNT: CPT

## 2023-09-19 PROCEDURE — G0378 HOSPITAL OBSERVATION PER HR: HCPCS

## 2023-09-19 PROCEDURE — 73560 X-RAY EXAM OF KNEE 1 OR 2: CPT

## 2023-09-19 PROCEDURE — 87075 CULTR BACTERIA EXCEPT BLOOD: CPT

## 2023-09-19 PROCEDURE — 87206 SMEAR FLUORESCENT/ACID STAI: CPT

## 2023-09-19 PROCEDURE — 0SRC0J9 REPLACEMENT OF RIGHT KNEE JOINT WITH SYNTHETIC SUBSTITUTE, CEMENTED, OPEN APPROACH: ICD-10-PCS | Performed by: ORTHOPAEDIC SURGERY

## 2023-09-19 PROCEDURE — 7100000001 HC PACU RECOVERY - ADDTL 15 MIN: Performed by: ORTHOPAEDIC SURGERY

## 2023-09-19 PROCEDURE — 3700000000 HC ANESTHESIA ATTENDED CARE: Performed by: ORTHOPAEDIC SURGERY

## 2023-09-19 PROCEDURE — 86850 RBC ANTIBODY SCREEN: CPT

## 2023-09-19 PROCEDURE — 3600000004 HC SURGERY LEVEL 4 BASE: Performed by: ORTHOPAEDIC SURGERY

## 2023-09-19 DEVICE — LEGION OXIN CONSTRAINED FEMORAL SZ                                    5 RT
Type: IMPLANTABLE DEVICE | Site: KNEE | Status: FUNCTIONAL
Brand: LEGION

## 2023-09-19 DEVICE — LEGION SCREW-ON LWEDGE 5D X 5 POS                                    SZ 5
Type: IMPLANTABLE DEVICE | Site: KNEE | Status: FUNCTIONAL
Brand: LEGION

## 2023-09-19 DEVICE — RALLY MV AB BONE CEMENT 40 GRAMS
Type: IMPLANTABLE DEVICE | Site: KNEE | Status: FUNCTIONAL
Brand: RALLY

## 2023-09-19 DEVICE — LEGION REVISION TIBIAL BASEPLATE                                    SIZE 4 RIGHT
Type: IMPLANTABLE DEVICE | Site: KNEE | Status: FUNCTIONAL
Brand: LEGION

## 2023-09-19 DEVICE — GENESIS II CONSTRAINED ARTICULAR                                    INSERT SIZE 3-4 18MM
Type: IMPLANTABLE DEVICE | Site: KNEE | Status: FUNCTIONAL
Brand: GENESIS II

## 2023-09-19 DEVICE — LEGION OFFSET COUPLER 4MM
Type: IMPLANTABLE DEVICE | Site: KNEE | Status: FUNCTIONAL
Brand: LEGION

## 2023-09-19 DEVICE — LEGION PRESSFIT STEM 16MM X 160MM
Type: IMPLANTABLE DEVICE | Site: KNEE | Status: FUNCTIONAL
Brand: LEGION

## 2023-09-19 RX ORDER — FAMOTIDINE 20 MG/1
20 TABLET, FILM COATED ORAL 2 TIMES DAILY
Status: DISCONTINUED | OUTPATIENT
Start: 2023-09-19 | End: 2023-09-19

## 2023-09-19 RX ORDER — SODIUM CHLORIDE 0.9 % (FLUSH) 0.9 %
5-40 SYRINGE (ML) INJECTION EVERY 12 HOURS SCHEDULED
Status: DISCONTINUED | OUTPATIENT
Start: 2023-09-19 | End: 2023-09-22 | Stop reason: HOSPADM

## 2023-09-19 RX ORDER — ONDANSETRON 2 MG/ML
4 INJECTION INTRAMUSCULAR; INTRAVENOUS EVERY 6 HOURS PRN
Status: DISCONTINUED | OUTPATIENT
Start: 2023-09-19 | End: 2023-09-22 | Stop reason: HOSPADM

## 2023-09-19 RX ORDER — MORPHINE SULFATE 2 MG/ML
2 INJECTION, SOLUTION INTRAMUSCULAR; INTRAVENOUS
Status: DISPENSED | OUTPATIENT
Start: 2023-09-19 | End: 2023-09-20

## 2023-09-19 RX ORDER — MEPERIDINE HYDROCHLORIDE 25 MG/ML
INJECTION INTRAMUSCULAR; INTRAVENOUS; SUBCUTANEOUS
Status: COMPLETED
Start: 2023-09-19 | End: 2023-09-19

## 2023-09-19 RX ORDER — ROPIVACAINE HYDROCHLORIDE 5 MG/ML
INJECTION, SOLUTION EPIDURAL; INFILTRATION; PERINEURAL
Status: COMPLETED | OUTPATIENT
Start: 2023-09-19 | End: 2023-09-19

## 2023-09-19 RX ORDER — FENTANYL CITRATE 50 UG/ML
50 INJECTION, SOLUTION INTRAMUSCULAR; INTRAVENOUS EVERY 5 MIN PRN
Status: DISCONTINUED | OUTPATIENT
Start: 2023-09-19 | End: 2023-09-19 | Stop reason: HOSPADM

## 2023-09-19 RX ORDER — SODIUM CHLORIDE 0.9 % (FLUSH) 0.9 %
5-40 SYRINGE (ML) INJECTION EVERY 12 HOURS SCHEDULED
Status: DISCONTINUED | OUTPATIENT
Start: 2023-09-19 | End: 2023-09-19 | Stop reason: HOSPADM

## 2023-09-19 RX ORDER — POLYETHYLENE GLYCOL 3350 17 G/17G
17 POWDER, FOR SOLUTION ORAL DAILY
Status: DISCONTINUED | OUTPATIENT
Start: 2023-09-19 | End: 2023-09-22 | Stop reason: HOSPADM

## 2023-09-19 RX ORDER — PROPOFOL 10 MG/ML
INJECTION, EMULSION INTRAVENOUS CONTINUOUS PRN
Status: DISCONTINUED | OUTPATIENT
Start: 2023-09-19 | End: 2023-09-19 | Stop reason: SDUPTHER

## 2023-09-19 RX ORDER — BUPIVACAINE HYDROCHLORIDE 5 MG/ML
INJECTION, SOLUTION EPIDURAL; INTRACAUDAL PRN
Status: DISCONTINUED | OUTPATIENT
Start: 2023-09-19 | End: 2023-09-19 | Stop reason: ALTCHOICE

## 2023-09-19 RX ORDER — FENTANYL CITRATE 50 UG/ML
INJECTION, SOLUTION INTRAMUSCULAR; INTRAVENOUS PRN
Status: DISCONTINUED | OUTPATIENT
Start: 2023-09-19 | End: 2023-09-19 | Stop reason: SDUPTHER

## 2023-09-19 RX ORDER — HYDRALAZINE HYDROCHLORIDE 20 MG/ML
INJECTION INTRAMUSCULAR; INTRAVENOUS
Status: COMPLETED
Start: 2023-09-19 | End: 2023-09-19

## 2023-09-19 RX ORDER — SODIUM CHLORIDE 9 MG/ML
INJECTION, SOLUTION INTRAVENOUS PRN
Status: DISCONTINUED | OUTPATIENT
Start: 2023-09-19 | End: 2023-09-19 | Stop reason: HOSPADM

## 2023-09-19 RX ORDER — NITROGLYCERIN 0.4 MG/1
0.4 TABLET SUBLINGUAL EVERY 5 MIN PRN
Status: DISCONTINUED | OUTPATIENT
Start: 2023-09-19 | End: 2023-09-22 | Stop reason: HOSPADM

## 2023-09-19 RX ORDER — MEPERIDINE HYDROCHLORIDE 25 MG/ML
12.5 INJECTION INTRAMUSCULAR; INTRAVENOUS; SUBCUTANEOUS EVERY 5 MIN PRN
Status: COMPLETED | OUTPATIENT
Start: 2023-09-19 | End: 2023-09-19

## 2023-09-19 RX ORDER — SODIUM CHLORIDE 9 MG/ML
INJECTION, SOLUTION INTRAVENOUS CONTINUOUS
Status: DISCONTINUED | OUTPATIENT
Start: 2023-09-19 | End: 2023-09-20

## 2023-09-19 RX ORDER — GLYCOPYRROLATE 0.2 MG/ML
INJECTION INTRAMUSCULAR; INTRAVENOUS PRN
Status: DISCONTINUED | OUTPATIENT
Start: 2023-09-19 | End: 2023-09-19 | Stop reason: SDUPTHER

## 2023-09-19 RX ORDER — HYDROCODONE BITARTRATE AND ACETAMINOPHEN 5; 325 MG/1; MG/1
2 TABLET ORAL EVERY 4 HOURS PRN
Status: DISCONTINUED | OUTPATIENT
Start: 2023-09-19 | End: 2023-09-22 | Stop reason: HOSPADM

## 2023-09-19 RX ORDER — VANCOMYCIN HYDROCHLORIDE 500 MG/10ML
INJECTION, POWDER, LYOPHILIZED, FOR SOLUTION INTRAVENOUS PRN
Status: DISCONTINUED | OUTPATIENT
Start: 2023-09-19 | End: 2023-09-19 | Stop reason: ALTCHOICE

## 2023-09-19 RX ORDER — FAMOTIDINE 20 MG/1
20 TABLET, FILM COATED ORAL DAILY
Status: DISCONTINUED | OUTPATIENT
Start: 2023-09-20 | End: 2023-09-22 | Stop reason: HOSPADM

## 2023-09-19 RX ORDER — LUBIPROSTONE 24 UG/1
24 CAPSULE ORAL 2 TIMES DAILY WITH MEALS
Status: DISCONTINUED | OUTPATIENT
Start: 2023-09-19 | End: 2023-09-22 | Stop reason: HOSPADM

## 2023-09-19 RX ORDER — ACETAMINOPHEN 325 MG/1
650 TABLET ORAL EVERY 6 HOURS
Status: DISCONTINUED | OUTPATIENT
Start: 2023-09-19 | End: 2023-09-22 | Stop reason: HOSPADM

## 2023-09-19 RX ORDER — TRANEXAMIC ACID 100 MG/ML
INJECTION, SOLUTION INTRAVENOUS PRN
Status: DISCONTINUED | OUTPATIENT
Start: 2023-09-19 | End: 2023-09-19 | Stop reason: ALTCHOICE

## 2023-09-19 RX ORDER — SODIUM CHLORIDE 0.9 % (FLUSH) 0.9 %
5-40 SYRINGE (ML) INJECTION PRN
Status: DISCONTINUED | OUTPATIENT
Start: 2023-09-19 | End: 2023-09-19 | Stop reason: HOSPADM

## 2023-09-19 RX ORDER — SODIUM CHLORIDE 0.9 % (FLUSH) 0.9 %
5-40 SYRINGE (ML) INJECTION PRN
Status: DISCONTINUED | OUTPATIENT
Start: 2023-09-19 | End: 2023-09-22 | Stop reason: HOSPADM

## 2023-09-19 RX ORDER — HYDRALAZINE HYDROCHLORIDE 20 MG/ML
10 INJECTION INTRAMUSCULAR; INTRAVENOUS
Status: DISCONTINUED | OUTPATIENT
Start: 2023-09-19 | End: 2023-09-19 | Stop reason: HOSPADM

## 2023-09-19 RX ORDER — BUPIVACAINE HYDROCHLORIDE 7.5 MG/ML
INJECTION, SOLUTION INTRASPINAL PRN
Status: DISCONTINUED | OUTPATIENT
Start: 2023-09-19 | End: 2023-09-19 | Stop reason: SDUPTHER

## 2023-09-19 RX ORDER — PANTOPRAZOLE SODIUM 40 MG/1
40 TABLET, DELAYED RELEASE ORAL
Status: DISCONTINUED | OUTPATIENT
Start: 2023-09-19 | End: 2023-09-19

## 2023-09-19 RX ORDER — CYCLOBENZAPRINE HCL 10 MG
10 TABLET ORAL 3 TIMES DAILY PRN
Status: DISCONTINUED | OUTPATIENT
Start: 2023-09-19 | End: 2023-09-22 | Stop reason: HOSPADM

## 2023-09-19 RX ORDER — MIDAZOLAM HYDROCHLORIDE 1 MG/ML
INJECTION INTRAMUSCULAR; INTRAVENOUS PRN
Status: DISCONTINUED | OUTPATIENT
Start: 2023-09-19 | End: 2023-09-19 | Stop reason: SDUPTHER

## 2023-09-19 RX ORDER — SODIUM CHLORIDE 9 MG/ML
INJECTION, SOLUTION INTRAVENOUS CONTINUOUS
Status: DISCONTINUED | OUTPATIENT
Start: 2023-09-19 | End: 2023-09-19 | Stop reason: HOSPADM

## 2023-09-19 RX ORDER — ALBUTEROL SULFATE 2.5 MG/3ML
3 SOLUTION RESPIRATORY (INHALATION) EVERY 6 HOURS PRN
Status: DISCONTINUED | OUTPATIENT
Start: 2023-09-19 | End: 2023-09-22 | Stop reason: HOSPADM

## 2023-09-19 RX ORDER — ACETAMINOPHEN 500 MG
1000 TABLET ORAL ONCE
Status: COMPLETED | OUTPATIENT
Start: 2023-09-19 | End: 2023-09-19

## 2023-09-19 RX ORDER — ENEMA 19; 7 G/133ML; G/133ML
1 ENEMA RECTAL DAILY PRN
Status: DISCONTINUED | OUTPATIENT
Start: 2023-09-19 | End: 2023-09-22 | Stop reason: HOSPADM

## 2023-09-19 RX ORDER — SODIUM CHLORIDE 9 MG/ML
INJECTION, SOLUTION INTRAVENOUS PRN
Status: DISCONTINUED | OUTPATIENT
Start: 2023-09-19 | End: 2023-09-22 | Stop reason: HOSPADM

## 2023-09-19 RX ORDER — FAMOTIDINE 20 MG/1
20 TABLET, FILM COATED ORAL 2 TIMES DAILY
COMMUNITY

## 2023-09-19 RX ORDER — TRAMADOL HYDROCHLORIDE 50 MG/1
50 TABLET ORAL EVERY 4 HOURS PRN
Status: DISCONTINUED | OUTPATIENT
Start: 2023-09-19 | End: 2023-09-22 | Stop reason: HOSPADM

## 2023-09-19 RX ORDER — FENTANYL CITRATE 50 UG/ML
INJECTION, SOLUTION INTRAMUSCULAR; INTRAVENOUS
Status: COMPLETED
Start: 2023-09-19 | End: 2023-09-19

## 2023-09-19 RX ORDER — HYDROXYZINE PAMOATE 25 MG/1
25 CAPSULE ORAL 3 TIMES DAILY PRN
Status: DISCONTINUED | OUTPATIENT
Start: 2023-09-19 | End: 2023-09-22 | Stop reason: HOSPADM

## 2023-09-19 RX ADMIN — HYDRALAZINE HYDROCHLORIDE 10 MG: 20 INJECTION, SOLUTION INTRAMUSCULAR; INTRAVENOUS at 11:53

## 2023-09-19 RX ADMIN — ONDANSETRON 4 MG: 2 INJECTION INTRAMUSCULAR; INTRAVENOUS at 15:28

## 2023-09-19 RX ADMIN — HYDROMORPHONE HYDROCHLORIDE 0.5 MG: 1 INJECTION, SOLUTION INTRAMUSCULAR; INTRAVENOUS; SUBCUTANEOUS at 11:40

## 2023-09-19 RX ADMIN — MORPHINE SULFATE 2 MG: 2 INJECTION, SOLUTION INTRAMUSCULAR; INTRAVENOUS at 17:03

## 2023-09-19 RX ADMIN — BUPIVACAINE HYDROCHLORIDE 1.6 MG: 7.5 INJECTION, SOLUTION SUBARACHNOID at 09:08

## 2023-09-19 RX ADMIN — Medication 2000 MG: at 08:58

## 2023-09-19 RX ADMIN — GLYCOPYRROLATE 0.2 MG: 0.2 INJECTION INTRAMUSCULAR; INTRAVENOUS at 09:12

## 2023-09-19 RX ADMIN — PROPOFOL 50 MCG/KG/MIN: 10 INJECTION, EMULSION INTRAVENOUS at 09:15

## 2023-09-19 RX ADMIN — FENTANYL CITRATE 50 MCG: 50 INJECTION, SOLUTION INTRAMUSCULAR; INTRAVENOUS at 10:18

## 2023-09-19 RX ADMIN — ROPIVACAINE HYDROCHLORIDE 30 ML: 5 INJECTION, SOLUTION EPIDURAL; INFILTRATION; PERINEURAL at 08:50

## 2023-09-19 RX ADMIN — MIDAZOLAM 2 MG: 1 INJECTION INTRAMUSCULAR; INTRAVENOUS at 08:58

## 2023-09-19 RX ADMIN — MEPERIDINE HYDROCHLORIDE 12.5 MG: 25 INJECTION, SOLUTION INTRAMUSCULAR; INTRAVENOUS; SUBCUTANEOUS at 11:27

## 2023-09-19 RX ADMIN — Medication 2000 MG: at 17:52

## 2023-09-19 RX ADMIN — MEPERIDINE HYDROCHLORIDE 12.5 MG: 25 INJECTION, SOLUTION INTRAMUSCULAR; INTRAVENOUS; SUBCUTANEOUS at 11:22

## 2023-09-19 RX ADMIN — SODIUM CHLORIDE: 9 INJECTION, SOLUTION INTRAVENOUS at 23:18

## 2023-09-19 RX ADMIN — HYDROCODONE BITARTRATE AND ACETAMINOPHEN 2 TABLET: 5; 325 TABLET ORAL at 13:23

## 2023-09-19 RX ADMIN — HYDROMORPHONE HYDROCHLORIDE 0.5 MG: 1 INJECTION, SOLUTION INTRAMUSCULAR; INTRAVENOUS; SUBCUTANEOUS at 11:33

## 2023-09-19 RX ADMIN — SODIUM CHLORIDE: 9 INJECTION, SOLUTION INTRAVENOUS at 07:56

## 2023-09-19 RX ADMIN — FENTANYL CITRATE 50 MCG: 50 INJECTION, SOLUTION INTRAMUSCULAR; INTRAVENOUS at 12:03

## 2023-09-19 RX ADMIN — HYDROXYZINE PAMOATE 25 MG: 25 CAPSULE ORAL at 13:23

## 2023-09-19 RX ADMIN — METOPROLOL TARTRATE 25 MG: 25 TABLET, FILM COATED ORAL at 21:14

## 2023-09-19 RX ADMIN — ACETAMINOPHEN 1000 MG: 500 TABLET ORAL at 08:04

## 2023-09-19 RX ADMIN — SODIUM CHLORIDE, PRESERVATIVE FREE 10 ML: 5 INJECTION INTRAVENOUS at 21:15

## 2023-09-19 RX ADMIN — FENTANYL CITRATE 50 MCG: 50 INJECTION, SOLUTION INTRAMUSCULAR; INTRAVENOUS at 10:24

## 2023-09-19 RX ADMIN — TRANEXAMIC ACID 1000 MG: 100 INJECTION, SOLUTION INTRAVENOUS at 09:15

## 2023-09-19 RX ADMIN — SODIUM CHLORIDE: 9 INJECTION, SOLUTION INTRAVENOUS at 11:11

## 2023-09-19 RX ADMIN — RIVAROXABAN 10 MG: 10 TABLET, FILM COATED ORAL at 17:56

## 2023-09-19 RX ADMIN — TRAZODONE HYDROCHLORIDE 150 MG: 50 TABLET ORAL at 21:13

## 2023-09-19 RX ADMIN — MORPHINE SULFATE 2 MG: 2 INJECTION, SOLUTION INTRAMUSCULAR; INTRAVENOUS at 21:19

## 2023-09-19 RX ADMIN — HYDRALAZINE HYDROCHLORIDE 10 MG: 20 INJECTION INTRAMUSCULAR; INTRAVENOUS at 11:53

## 2023-09-19 RX ADMIN — CYCLOBENZAPRINE 10 MG: 10 TABLET, FILM COATED ORAL at 20:07

## 2023-09-19 RX ADMIN — FENTANYL CITRATE 50 MCG: 50 INJECTION, SOLUTION INTRAMUSCULAR; INTRAVENOUS at 09:23

## 2023-09-19 ASSESSMENT — PAIN DESCRIPTION - LOCATION
LOCATION: LEG
LOCATION: KNEE
LOCATION: LEG
LOCATION: KNEE
LOCATION: LEG
LOCATION: LEG
LOCATION: KNEE
LOCATION: KNEE

## 2023-09-19 ASSESSMENT — PAIN SCALES - GENERAL
PAINLEVEL_OUTOF10: 8
PAINLEVEL_OUTOF10: 10
PAINLEVEL_OUTOF10: 7
PAINLEVEL_OUTOF10: 7
PAINLEVEL_OUTOF10: 4
PAINLEVEL_OUTOF10: 9
PAINLEVEL_OUTOF10: 10
PAINLEVEL_OUTOF10: 8
PAINLEVEL_OUTOF10: 7
PAINLEVEL_OUTOF10: 2
PAINLEVEL_OUTOF10: 10

## 2023-09-19 ASSESSMENT — PAIN DESCRIPTION - DESCRIPTORS
DESCRIPTORS: ACHING
DESCRIPTORS: SPASM
DESCRIPTORS: ACHING

## 2023-09-19 ASSESSMENT — PAIN DESCRIPTION - ORIENTATION
ORIENTATION: RIGHT

## 2023-09-19 ASSESSMENT — PAIN - FUNCTIONAL ASSESSMENT
PAIN_FUNCTIONAL_ASSESSMENT: PREVENTS OR INTERFERES SOME ACTIVE ACTIVITIES AND ADLS
PAIN_FUNCTIONAL_ASSESSMENT: 0-10

## 2023-09-19 ASSESSMENT — ENCOUNTER SYMPTOMS: SHORTNESS OF BREATH: 1

## 2023-09-19 ASSESSMENT — PAIN DESCRIPTION - PAIN TYPE
TYPE: SURGICAL PAIN
TYPE: SURGICAL PAIN

## 2023-09-19 ASSESSMENT — PAIN DESCRIPTION - ONSET: ONSET: ON-GOING

## 2023-09-19 ASSESSMENT — PAIN DESCRIPTION - FREQUENCY: FREQUENCY: CONTINUOUS

## 2023-09-19 ASSESSMENT — PAIN SCALES - WONG BAKER: WONGBAKER_NUMERICALRESPONSE: 0

## 2023-09-19 NOTE — PROGRESS NOTES
Pt admitted to  7K27 via cart/stretcher. Complaints: Post Revision of Right total knee, drain cyst.      IV normal saline infusing into the upper arm left, condition patent and no redness at a rate of 200 mls/ hour with about 125 mls in the bag still. IV site free of s/s of infection or infiltration. Vital signs obtained. Assessment and data collection initiated. Two nurse skin assessment performed by [facility] RN and Hailey. Oriented to room. Policies and procedures for  explained. All questions answered with no further questions at this time. Fall prevention and safety brochure discussed with patient. Bed alarm on. Call light in reach.

## 2023-09-19 NOTE — H&P
[place] [address]                       PREOPERATIVE HISTORY AND PHYSICAL    PATIENT NAME: Anita Campos                     :        1945  MED REC NO:   [id number]                           ROOM:  ACCOUNT NO:   [de-identified]                            ADMIT DATE:  PROVIDER:     Ricardo Gonzalez. BARABRA Fernando PREOPERATIVE DIAGNOSES:  Right total knee replacement with instability  and Baker cyst.    PAST MEDICAL HISTORY:  Includes sleep apnea, asthma, COPD, hypertension. ALLERGIES:  NO KNOWN DRUG ALLERGIES. SOCIAL HISTORY:  Denies tobacco use. Drinks alcohol socially. FAMILY HISTORY:  Mother with arthritis. REVIEW OF SYSTEMS:  Positive for joint pain and joint stiffness. Denies  nausea, vomiting, diarrhea, or chest pain. CURRENT MEDICATIONS:  Amitiza, Celebrex and Dyazide. PAST SURGICAL HISTORY:  Includes right knee replacement. HISTORY OF PRESENT ILLNESS:  The patient is a pleasant 41-year-old  female who had a right total knee replacement done by Dr. Richard Calhoun in  . She states it never really felt great. She has noticed  increasing pain and discomfort, worse with her activities of daily  living including walking, going up and down stairs, in and out of  chairs, in and out of cars. She has tried and failed conservative  measures including anti-inflammatory medications, activity  modifications, at-home physician-instructed physical therapy. At this  point, she has elected to move forward with more definitive treatment in  the form of revision of right knee with Baker cyst aspiration. The  risks and benefits of surgery were discussed with her in detail  including but not limited to DVT; PE; flare-up of medical problems; limb  length inequality; stiffness; infection; nerve, artery, vein, bone, and  soft tissue damage; and she would like to proceed.     PHYSICAL EXAMINATION:  GENERAL:  A well-developed,

## 2023-09-19 NOTE — PROGRESS NOTES
Pharmacy Note - Renal dose adjustment made per P/T protocol    Original order:  Famotidine 20 mg po BID    Estimated Creatinine Clearance: 44 mL/min (based on SCr of 1 mg/dL). No results for input(s): \"BUN\", \"CREATININE\" in the last 72 hours. Renally adjusted order:  Famotidine 20 mg po daily    Please call pharmacy with any questions.     Thank you,  FREDY Martinez [facility]  9/19/2023 4:54 PM

## 2023-09-19 NOTE — ANESTHESIA PROCEDURE NOTES
Peripheral Block    Patient location during procedure: pre-op  Reason for block: post-op pain management and at surgeon's request  Start time: 9/19/2023 8:47 AM  End time: 9/19/2023 8:51 AM  Staffing  Performed: anesthesiologist   Anesthesiologist: Apryl Villela DO  Performed by: Apryl Villela DO  Authorized by: Apryl Villela DO    Preanesthetic Checklist  Completed: patient identified, IV checked, site marked, risks and benefits discussed, surgical/procedural consents, equipment checked, pre-op evaluation, timeout performed, anesthesia consent given, oxygen available and monitors applied/VS acknowledged  Peripheral Block   Patient position: supine  Prep: ChloraPrep  Provider prep: mask and sterile gloves  Patient monitoring: cardiac monitor, continuous pulse ox, frequent blood pressure checks and IV access  Block type: Femoral  Adductor canal (Low Femoral)  Laterality: right  Injection technique: single-shot  Guidance: ultrasound guided  Local infiltration: lidocaine  Infiltration strength: 1 %  Local infiltration: lidocaine  Dose: 3 mL    Needle   Needle type: insulated echogenic nerve stimulator needle   Needle gauge: 21 G  Needle localization: ultrasound guidance and anatomical landmarks  Needle insertion depth: 3.1 cm  Needle length: 10 cm  Assessment   Injection assessment: negative aspiration for heme, no paresthesia on injection and local visualized surrounding nerve on ultrasound  Paresthesia pain: none  Slow fractionated injection: yes  Hemodynamics: stableno  Outcomes: uncomplicated and patient tolerated procedure well    Additional Notes  U/S 09506. A time out was performed with Yunier Madison RN. Pt verbally confirmed right side is the correct knee. Dr. Ana Bangura has marked the right knee as well. (1) Under ultrasound guidance, a 21 gauge needle was inserted and placed in close proximity to the obturator nerve.   (2) Ultrasound was also used to visualize the spread of the anesthetic

## 2023-09-19 NOTE — BRIEF OP NOTE
Brief Postoperative Note      Patient: Delonte Mckeon  YOB: 1945  MRN: [id number]    Date of Procedure: 9/19/2023    Pre-Op Diagnosis Codes:     * Unstable right knee [M25.361]     * Synovial cyst of popliteal space, unspecified laterality [M71.20]    Post-Op Diagnosis: Same       Procedure(s):  Revision Right Total Knee,Drain Cyst    Surgeon(s):  Augie Barajas MD    Assistant:  Physician Assistant: SHIRAZ Love    Anesthesia: Spinal    Estimated Blood Loss (mL): Minimal    Complications: None    Specimens:   ID Type Source Tests Collected by Time Destination   1 : Right knee Body Fluid Body Fluid Joint, Knee CELL COUNT WITH DIFFERENTIAL, BODY FLUID (Canceled), CULTURE, FUNGUS, AFB STAIN, CRYSTALS, BODY FLUID (Canceled), CULTURE, ANAEROBIC AND AEROBIC Augie Barajas MD 9/19/2023 3329    A : RIGHT KNEE CAPSULE- LABEL \"QUERY ACUTE INFLAMMATION\" Tissue Joint, Knee FROZEN SECTION, CULTURE, ANAEROBIC AND AEROBIC Augie Barajas MD 9/19/2023 5454    B : RIGHT FEMORAL MEMBRANE-LABEL \"QUERY ACUTE INFLAMMATION\" Tissue Joint, Knee FROZEN SECTION, CULTURE, ANAEROBIC AND AEROBIC Augie Barajas MD 9/19/2023 1744    C : RIGHT TIBIAL MEMBRANE-LABEL \"QUERY ACUTE INFLAMMATION\" Tissue Joint, Knee FROZEN SECTION, CULTURE, ANAEROBIC AND AEROBIC Augie Braajas MD 9/19/2023 2501        Implants:  Implant Name Type Inv.  Item Serial No.  Lot No. LRB No. Used Action   PIN FIX L3IN DIA1/8IN S STL [facility] - [id number]  PIN FIX L3IN DIA1/8IN S STL [place] [facility] AND [facility] ORTHOPAEDICS- [id number] Right 1 Implanted   CEMENT BONE 40GM Ina Jansen [id number]  CEMENT BONE 40GM M VISC AB [facility] AND NEPH ORTHOPAEDICS- [id number] Right 1 Implanted   CEMENT BONE 40GM [employer] DANIELE - [id number]  CEMENT BONE 40GM M VISC AB [facility] AND [facility] ORTHOPAEDICS- [id number] Right 1 Implanted   BASEPLATE TIB SZ 4 R KNEE REV KERRY FEM [place] - [id number] BASEPLATE TIB SZ 4 R KNEE REV KERRY FEM TAPR [facility] ORTHOPAEDICS- [id number] Right 1 Implanted   STEM FEM L160MM [id number] KNEE PRESSFIT [facility] - [id number]  STEM FEM L160MM [id number] KNEE PRESSFIT [facility] ORTHOPAEDICS- [id number] Right 1 Implanted   COMPONENT FEM SZ 5 R KNEE OXINIUM REV KERRY [facility] - [id number]  COMPONENT FEM SZ 5 R KNEE OXINIUM REV KERRY [facility] ORTHOPAEDICS- [id number] Right 1 Implanted   WEDGE FEM L SZ 5 5MM DST POST KNEE SCR ON [facility] - [id number]  WEDGE FEM L SZ 5 5MM DST POST KNEE SCR ON [facility] ORTHOPAEDICS- [id number] Right 1 Implanted   STEM FEM L160MM [id number] KNEE PRESSFIT [facility] - [id number]  STEM FEM L160MM [id number] KNEE PRESSFIT [facility] ORTHOPAEDICS- [id number] Right 1 Implanted    FIX OFFSET +4MM FEM KNEE [facility] - [id number]   FIX OFFSET +4MM FEM KNEE [facility] ORTHOPAEDICS- [id number] Right 1 Implanted   INSERT TIB SZ 3-4 [id number] KNEE POLY POST STBL CONSTRN [place] - [id number]  INSERT TIB SZ 3-4 [id number] KNEE POLY POST STBL CONSTRN [place]  [facility] Lenora [id number] Right 1 Implanted         Drains: * No LDAs found *    Findings: See operative dictation      Electronically signed by Marcheta Leventhal, MD on 9/19/2023 at 10:56 AM

## 2023-09-19 NOTE — PROGRESS NOTES
[facility]. Vitals and assessment as charted. 0.9 infusing, to count from PACU. Pt has crackers and water. Family at the bedside. Pt and family verbalized understanding of discharge criteria and call light use. Call light in reach.

## 2023-09-19 NOTE — PROGRESS NOTES
1108: Pt arrives to pacu, awakens to verbal stimuli but unable to follow commands. Pt on simple mask with npa in right nare, respirations easy and unlabored. VSS  1122: Pt shivering, 12.5mg of demerol administered  1127: Pt continues to shiver, 12.5mg of demerol given  1133: Pt states pain 10/10, 0.5mg of dilaudid administered  1140: No change in pain, 0.5mg of dilaudid given  1153: 10mg of hydralazine administered for elevated bp   1203: Pt states pain 8/10, 50mcg of fentanyl administered  1215: Pt sleeping, easily awakens to voice and quickly drifts back to sleep  1223: Pt transported back to [facility] in stable condition.  VSS

## 2023-09-19 NOTE — PROGRESS NOTES
Report called to [place] on [address] [facility].  Pt transferred with transport team.

## 2023-09-19 NOTE — PROCEDURES
Bladder scan completed at 1810 and results told to [facility]. Scan showed 733 mL in the patients bladder. Last void was unknown.  Torito Ramirez CET

## 2023-09-19 NOTE — H&P
[facility]  History and Physical Update    Pt Name: Abhay Hernandez  MRN: [id number]  YOB: 1945  Date of evaluation: 9/19/2023    I have examined the patient and reviewed the H&P/Consult and there are no changes to the patient or plans.       Vikram Acosta MD  Electronically signed 9/19/2023 at 7:32 AM

## 2023-09-20 LAB
ACID FAST STN SPEC: NORMAL
ANION GAP SERPL CALC-SCNC: 11 MEQ/L (ref 8–16)
BUN SERPL-MCNC: 8 MG/DL (ref 7–22)
CALCIUM SERPL-MCNC: 8.8 MG/DL (ref 8.5–10.5)
CHLORIDE SERPL-SCNC: 100 MEQ/L (ref 98–111)
CO2 SERPL-SCNC: 25 MEQ/L (ref 23–33)
CREAT SERPL-MCNC: 0.9 MG/DL (ref 0.4–1.2)
DEPRECATED RDW RBC AUTO: 44.8 FL (ref 35–45)
ERYTHROCYTE [DISTWIDTH] IN BLOOD BY AUTOMATED COUNT: 13.1 % (ref 11.5–14.5)
GFR SERPL CREATININE-BSD FRML MDRD: > 60 ML/MIN/1.73M2
GLUCOSE SERPL-MCNC: 149 MG/DL (ref 70–108)
HCT VFR BLD AUTO: 34.8 % (ref 37–47)
HGB BLD-MCNC: 11.4 GM/DL (ref 12–16)
MCH RBC QN AUTO: 30.5 PG (ref 26–33)
MCHC RBC AUTO-ENTMCNC: 32.8 GM/DL (ref 32.2–35.5)
MCV RBC AUTO: 93 FL (ref 81–99)
PLATELET # BLD AUTO: 182 THOU/MM3 (ref 130–400)
PMV BLD AUTO: 10.7 FL (ref 9.4–12.4)
POTASSIUM SERPL-SCNC: 3.5 MEQ/L (ref 3.5–5.2)
RBC # BLD AUTO: 3.74 MILL/MM3 (ref 4.2–5.4)
SODIUM SERPL-SCNC: 136 MEQ/L (ref 135–145)
TSH SERPL DL<=0.005 MIU/L-ACNC: 0.65 UIU/ML (ref 0.4–4.2)
WBC # BLD AUTO: 9.6 THOU/MM3 (ref 4.8–10.8)

## 2023-09-20 PROCEDURE — 80048 BASIC METABOLIC PNL TOTAL CA: CPT

## 2023-09-20 PROCEDURE — 99222 1ST HOSP IP/OBS MODERATE 55: CPT | Performed by: PHYSICIAN ASSISTANT

## 2023-09-20 PROCEDURE — 97530 THERAPEUTIC ACTIVITIES: CPT

## 2023-09-20 PROCEDURE — 6370000000 HC RX 637 (ALT 250 FOR IP): Performed by: PHYSICIAN ASSISTANT

## 2023-09-20 PROCEDURE — 1200000000 HC SEMI PRIVATE

## 2023-09-20 PROCEDURE — 36415 COLL VENOUS BLD VENIPUNCTURE: CPT

## 2023-09-20 PROCEDURE — 84443 ASSAY THYROID STIM HORMONE: CPT

## 2023-09-20 PROCEDURE — 93005 ELECTROCARDIOGRAM TRACING: CPT | Performed by: PHYSICIAN ASSISTANT

## 2023-09-20 PROCEDURE — 6360000002 HC RX W HCPCS: Performed by: ORTHOPAEDIC SURGERY

## 2023-09-20 PROCEDURE — 85027 COMPLETE CBC AUTOMATED: CPT

## 2023-09-20 PROCEDURE — 97166 OT EVAL MOD COMPLEX 45 MIN: CPT

## 2023-09-20 PROCEDURE — G0378 HOSPITAL OBSERVATION PER HR: HCPCS

## 2023-09-20 PROCEDURE — 96374 THER/PROPH/DIAG INJ IV PUSH: CPT

## 2023-09-20 PROCEDURE — 6370000000 HC RX 637 (ALT 250 FOR IP): Performed by: ORTHOPAEDIC SURGERY

## 2023-09-20 PROCEDURE — 97162 PT EVAL MOD COMPLEX 30 MIN: CPT

## 2023-09-20 PROCEDURE — 2580000003 HC RX 258: Performed by: ORTHOPAEDIC SURGERY

## 2023-09-20 RX ORDER — CHLORTHALIDONE 25 MG/1
25 TABLET ORAL DAILY
Status: DISCONTINUED | OUTPATIENT
Start: 2023-09-20 | End: 2023-09-22 | Stop reason: HOSPADM

## 2023-09-20 RX ORDER — HYDRALAZINE HYDROCHLORIDE 20 MG/ML
5 INJECTION INTRAMUSCULAR; INTRAVENOUS EVERY 6 HOURS PRN
Status: DISCONTINUED | OUTPATIENT
Start: 2023-09-20 | End: 2023-09-22 | Stop reason: HOSPADM

## 2023-09-20 RX ORDER — LEVOTHYROXINE SODIUM 0.03 MG/1
25 TABLET ORAL DAILY
Status: DISCONTINUED | OUTPATIENT
Start: 2023-09-21 | End: 2023-09-22 | Stop reason: HOSPADM

## 2023-09-20 RX ADMIN — RIVAROXABAN 10 MG: 10 TABLET, FILM COATED ORAL at 17:49

## 2023-09-20 RX ADMIN — POLYETHYLENE GLYCOL 3350 17 G: 17 POWDER, FOR SOLUTION ORAL at 08:35

## 2023-09-20 RX ADMIN — FAMOTIDINE 20 MG: 20 TABLET ORAL at 08:35

## 2023-09-20 RX ADMIN — Medication 2000 MG: at 08:40

## 2023-09-20 RX ADMIN — TRAZODONE HYDROCHLORIDE 150 MG: 50 TABLET ORAL at 21:22

## 2023-09-20 RX ADMIN — HYDROCODONE BITARTRATE AND ACETAMINOPHEN 2 TABLET: 5; 325 TABLET ORAL at 05:50

## 2023-09-20 RX ADMIN — METOPROLOL TARTRATE 25 MG: 25 TABLET, FILM COATED ORAL at 08:35

## 2023-09-20 RX ADMIN — METOPROLOL TARTRATE 25 MG: 25 TABLET, FILM COATED ORAL at 21:22

## 2023-09-20 RX ADMIN — CYCLOBENZAPRINE 10 MG: 10 TABLET, FILM COATED ORAL at 08:35

## 2023-09-20 RX ADMIN — CHLORTHALIDONE 25 MG: 25 TABLET ORAL at 22:46

## 2023-09-20 RX ADMIN — ACETAMINOPHEN 650 MG: 325 TABLET ORAL at 17:48

## 2023-09-20 RX ADMIN — MORPHINE SULFATE 2 MG: 2 INJECTION, SOLUTION INTRAMUSCULAR; INTRAVENOUS at 08:35

## 2023-09-20 RX ADMIN — Medication 2000 MG: at 17:51

## 2023-09-20 RX ADMIN — SODIUM CHLORIDE, PRESERVATIVE FREE 10 ML: 5 INJECTION INTRAVENOUS at 21:22

## 2023-09-20 RX ADMIN — Medication 2000 MG: at 03:23

## 2023-09-20 ASSESSMENT — PAIN DESCRIPTION - LOCATION
LOCATION: LEG
LOCATION: HIP;LEG
LOCATION: LEG
LOCATION: LEG;HIP
LOCATION: LEG
LOCATION: LEG

## 2023-09-20 ASSESSMENT — PAIN SCALES - GENERAL
PAINLEVEL_OUTOF10: 8
PAINLEVEL_OUTOF10: 6
PAINLEVEL_OUTOF10: 5
PAINLEVEL_OUTOF10: 7
PAINLEVEL_OUTOF10: 4
PAINLEVEL_OUTOF10: 4
PAINLEVEL_OUTOF10: 6
PAINLEVEL_OUTOF10: 8

## 2023-09-20 ASSESSMENT — PAIN DESCRIPTION - ONSET
ONSET: ON-GOING

## 2023-09-20 ASSESSMENT — PAIN DESCRIPTION - DESCRIPTORS
DESCRIPTORS: ACHING

## 2023-09-20 ASSESSMENT — PAIN DESCRIPTION - FREQUENCY
FREQUENCY: CONTINUOUS

## 2023-09-20 ASSESSMENT — PAIN - FUNCTIONAL ASSESSMENT
PAIN_FUNCTIONAL_ASSESSMENT: ACTIVITIES ARE NOT PREVENTED

## 2023-09-20 ASSESSMENT — PAIN DESCRIPTION - ORIENTATION
ORIENTATION: LEFT;RIGHT
ORIENTATION: RIGHT
ORIENTATION: RIGHT;LEFT

## 2023-09-20 ASSESSMENT — PAIN DESCRIPTION - PAIN TYPE
TYPE: SURGICAL PAIN

## 2023-09-20 ASSESSMENT — ENCOUNTER SYMPTOMS
COUGH: 0
DIARRHEA: 0
ABDOMINAL PAIN: 0
NAUSEA: 0
CHOKING: 0
SHORTNESS OF BREATH: 0

## 2023-09-20 ASSESSMENT — PAIN DESCRIPTION - DIRECTION
RADIATING_TOWARDS: RIGHT KNEE
RADIATING_TOWARDS: R KNEE
RADIATING_TOWARDS: RIGHT KNEE

## 2023-09-20 NOTE — CONSULTS
Consult Note        Chief Complaint:  High blood pressure    Date of Service: Pt seen/examined in consultation on 9/20/2023    Assessment/Plan:    Accelerated Hypertension  Metoprolol 25mg BID  Start chlorthalidone 12.5mg - Monitor BMP   Hydralazine PRN   Pain control   Last OV 9/19 with /90  Unable to tolerate ACEI or ARB due to reported cough   Echo 11/2020 with mild LVH otherwise unremarkable  Stress test 1/2021 negative   Obtain EKG     POD 1 revision right TKA with Baker's Cyst drainage  Managed by primary. Xarelto for DVT prophylaxsis  IBS  Continue amitiza   GERD  Continue pepcid   Hypothyroidism  Continue synthroid 25mcg. Last TSH 2.06 3 months ago- order updated        History Of Present Illness:      69 y.o. female who we are asked to see/evaluate by Lala Avila MD for medical management of high blood pressure perioperatively. Rose Morales was admitted 9/19 for elective revision of right TKA with Bakers Cyst drainage. BP has been running 320-832 systolic post operatively. Patient has a long standing history of hypertension, currently on metoprolol. She has been on ACEI ARB in the past with side effect of cough. EKG ordered. Reviewed cardiac workup- no significant findings in last three years with echo and stress. Last outpatient OV 9/19 BP recorded as 148/90. At time of exam, manual BP reading was 158/90. Patient denies any symptoms of chest pain, SOB, headache, visual changes.        Past Medical History:        Diagnosis Date    COPD (chronic obstructive pulmonary disease) (HCC)     DJD (degenerative joint disease)     knees bilat, right arm, low back    GERD (gastroesophageal reflux disease)     Hypertension     IBS (irritable bowel syndrome)     Insomnia     Osteoarthritis     Pap smear, as part of routine gynecological examination 03/2011    PPD positive 07/15/2015    Screening mammogram 03/2011    Sleep apnea     Thyroid disease        Past Surgical History:        Procedure software. It may contain minor errors which are inherent in voice recognition technology. ** Final report electronically signed by DR Kamaljit Tirado on 9/20/2023 12:57 PM         Thank you for the consultation.     Electronically signed by Kwame Toro PA-C on 9/20/2023 at 5:52 PM

## 2023-09-20 NOTE — PLAN OF CARE
Problem: Discharge Planning  Goal: Discharge to home or other facility with appropriate resources  9/20/2023 1705 by Charis Lee RN  Outcome: Progressing  Flowsheets (Taken 9/20/2023 1705)  Discharge to home or other facility with appropriate resources:   Identify barriers to discharge with patient and caregiver   Arrange for needed discharge resources and transportation as appropriate   Identify discharge learning needs (meds, wound care, etc)     Problem: Chronic Conditions and Co-morbidities  Goal: Patient's chronic conditions and co-morbidity symptoms are monitored and maintained or improved  9/20/2023 1705 by Charis Lee RN  Outcome: Progressing  Flowsheets (Taken 9/20/2023 1705)  Care Plan - Patient's Chronic Conditions and Co-Morbidity Symptoms are Monitored and Maintained or Improved:   Monitor and assess patient's chronic conditions and comorbid symptoms for stability, deterioration, or improvement   Collaborate with multidisciplinary team to address chronic and comorbid conditions and prevent exacerbation or deterioration   Update acute care plan with appropriate goals if chronic or comorbid symptoms are exacerbated and prevent overall improvement and discharge     Problem: Pain  Goal: Verbalizes/displays adequate comfort level or baseline comfort level  9/20/2023 1705 by Charis Lee RN  Outcome: Progressing  Flowsheets (Taken 9/20/2023 1705)  Verbalizes/displays adequate comfort level or baseline comfort level:   Encourage patient to monitor pain and request assistance   Assess pain using appropriate pain scale   Administer analgesics based on type and severity of pain and evaluate response   Implement non-pharmacological measures as appropriate and evaluate response     Problem: Safety - Adult  Goal: Free from fall injury  9/20/2023 1705 by Charis Lee RN  Outcome: Progressing  Flowsheets (Taken 9/20/2023 1705)  Free From Fall Injury: Instruct family/caregiver on patient safety     Problem:

## 2023-09-20 NOTE — OP NOTE
[place] [address]                                OPERATIVE REPORT    PATIENT NAME: Francisco Quiroz                     :        1945  MED REC NO:   [id number]                           ROOM:       [id number]  ACCOUNT NO:   [de-identified]                           ADMIT DATE: 2023  PROVIDER:     Nohemi Frost M.D.    Falguni Ferrera OF PROCEDURE:  2023    ATTENDING SURGEON:  Nohemi Frost MD    ASSISTANT:  Hiawatha Kanner, PA-C. 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    POSTOPERATIVE DIAGNOSES:  Failed right total knee replacement and  Baker's cyst.    OPERATION PERFORMED:  1. Revision right total knee replacement (except patella). 2.  Drainage of Baker's cyst.    MODIFIER 22: This surgery took 50% longer than normal secondary to body  mass index of 32.3. ANESTHESIA:  Spinal plus block. SPECIMENS TO PATHOLOGY:  Labrum for culture and sensitivity. IMPLANTS:  These are from [facility] [place] [facility] [employer]  1. Size 5 right Legion constrained Oxinium femoral component with a 5 x  5 lateral wedge, a 4 mm offset  and a 16 x 160 mm stem. 2.  Size 4 right Legion tibial baseplate with a 16 x 042 mm stem. 3.  Size 18 mm thick constrained polyethylene insert. COMPLICATIONS:  None. TOURNIQUET TIME:  Approximately 100 minutes at 250 mmHg. ANTIBIOTICS:  Preoperative Ancef, intraoperative vancomycin powder, and  Irrisept. HISTORY OF PRESENT ILLNESS:  The patient is a pleasant 56-year-old  female who we have been following for quite some time for her right knee  instability and pain. She had it done many years ago and she has gross  instability as well as obvious rupture of her PCL on x-ray. She has  gone on to develop increasing problems with this knee, tried  conservative measures including braces and therapy, have not helped.    She also developed a Baker's cyst at the back of the knee as well. At  this point because of failed conservative measures, she has opted to  move on surgical intervention for a knee revision on the right side. Risks and benefits including possible complications such as infection,  dislocation, neurovascular compromise, DVT/PE, flare up of medical  problems, limb length inequality, stiffness, and recovery times, etc.   She is scheduled for the above procedure. DETAILS OF OPERATION:  The patient was brought to the operating room and  placed on the operating table in supine position. After spinal block  had been obtained, tourniquet was placed in the right proximal thigh. Right lower extremity was prepped and draped in usual sterile fashion. Leg was exsanguinated with Esmarch. Tourniquet was inflated to 250  mmHg. Using the old incision, the incision was made and carried down to  the subcutaneous tissue to the extensor mechanism. A medial  parapatellar incision was made. Incision was carried down to the knee. We got through a large joint fluid as well as we got into significant  metallosis. We then brought the knee up to extension, cleaning off the  medial gutter and everted the patella, stripped the patella _____ was  noted to be intact and there was no burnishing. We then cleaned up the  lateral gutter, flexed the knee up, removed the locking pin from the  polyethylene insert, removed the polyethylene insert and noted that the  posterior and medial aspect of the poly was completely worn through on  to the metal tray, and then we also noted that the anterior femur on the  medial femoral condyle had been articulating with this posterior medial  corner somehow as there was wear away of the metal on the anterior  medial femoral condyle as well. The femoral component was then surrounded with a combination of saw and  osteotomes that was removed without difficulty. Tibial component was  surrounded with combination of saw and osteotomes.

## 2023-09-20 NOTE — PROGRESS NOTES
[facility]  INPATIENT PHYSICAL THERAPY  EVALUATION  [facility] ORTHOPEDICS 7K - 7N-90/069-U    Time In: 7778  Time Out: 1135  Timed Code Treatment Minutes: 14 Minutes  Minutes: 22          Date: 2023  Patient Name: Sacha Vidal,  Gender:  female        MRN: [id number]  : 1945  (69 y.o.)      Referring Practitioner: Hugo Faith MD  Diagnosis: unstable right knee  Additional Pertinent Hx: Pt is s/p Revision Right Total Knee,Drain Cyst completed by Dr. Kaya Chaudhary on . Restrictions/Precautions:  Restrictions/Precautions: General Precautions, Fall Risk, Weight Bearing  Right Lower Extremity Weight Bearing: Weight Bearing As Tolerated    Subjective:  Chart Reviewed: Yes  Patient assessed for rehabilitation services?: Yes  Family / Caregiver Present: No  Subjective: OK to see pt per nursing. Pt in bed when PT arrived, sleeing, easily awakened, groggy during session. Agreeable to sit in chair for lunch. Pt reports she was up to the bathroom with staff yesterday following sx.     General:  Overall Orientation Status: Within Normal Limits  Orientation Level: Oriented X4  Vision: Impaired  Vision Exceptions: Wears glasses for reading  Hearing: Within functional limits       Pain: 5/10: R knee    Vitals: Vitals not assessed per clinical judgement, see nursing flowsheet    Social/Functional History:    Lives With: Son  Type of Home: House  Home Layout: Two level, Performs ADL's on one level  Home Access: Stairs to enter with rails  Entrance Stairs - Number of Steps: 4 LATA  Entrance Stairs - Rails: Right  Home Equipment: Walker, rolling, Cane     Bathroom Shower/Tub: Walk-in shower  Bathroom Toilet: Handicap height  IADL Comments: son assist with cleaning tasks       ADL Assistance: Independent  Homemaking Assistance: Needs assistance  Ambulation Assistance: Independent  Transfer Assistance: Independent    Active : Yes  Mode of Transportation: SUV  Occupation: Retired  Additional Comments: Pt reports her son drive to [place] weekly, however is around the most of the day. Pt amb with no AD prior. OBJECTIVE:  Range of Motion:  Right Lower Extremity: Impaired - deconditioned  Left Lower Extremity: WNL    Strength:  Right Lower Extremity: Impaired - deconditioned  Left Lower Extremity: WNL    Balance:  Static Sitting Balance:  Supervision, Stand By Assistance  Static Standing Balance: Contact Guard Assistance, X 1, with cues for safety, with verbal cues   Seated on EOB, PT assisted with donning socks for mobility, RW required in standing, decreased WS on R LE in standing  Bed Mobility:  Supine to Sit: Stand By Assistance, X 1, with head of bed raised, with rail, with verbal cues , with increased time for completion  Scooting: Stand By Assistance, X 1, with verbal cues  towards EOB  Slow pace with bed tasks    Transfers:  Sit to Stand: [employer], X 1, cues for hand placement, with verbal cues  Stand to Sit:Contact Guard Assistance, X 1, cues for hand placement, with verbal cues  RW for support, decreased carryover with safe hand placement, no LOB noted. Ambulation:  Contact Guard Assistance, X 1, with cues for safety, with verbal cues , with increased time for completion  Distance: 3 feet to chair, decreased further mobility  Surface: Level Tile  Device:Rolling Walker  Gait Deviations: Forward Flexed Posture, Slow Mabel, Decreased Step Length Bilaterally, Decreased Weight Shift Right, Decreased Gait Speed, Decreased Heel Strike on Right, and Decreased Terminal Knee Extension on R LE  Cues for maintaining walker close to self, fair demo. Cues required for improved sequencing with stepping as pt stepping with R LE to far out in front of self, fair carryover. Functional Outcome Measures: Completed  AM-PAC Inpatient Mobility Raw Score : 16  AM-PAC Inpatient T-Scale Score : 40.78    ASSESSMENT:  Activity Tolerance:  Patient tolerance of  treatment: fair.  Groggy and cues for training, Functional mobility training, Neuromuscular re-education, Endurance training, Equipment evaluation, education, & procurement, Patient/Caregiver education & training, Transfer training, Safety education & training, Home exercise program, Therapeutic activities  General Plan:  (6x O)    Goals:  Patient Goals : \"walk more and move around\"  Short Term Goals  Time Frame for Short Term Goals: by discharge  Short Term Goal 1: Pt will demo IND with bed mobilty tasks with bed flat to return home safely. Short Term Goal 2: Pt will demo sit to/from stand transfers with RW with  Short Term Goal 3: Pt will amb for 80 feet with RW with S to progress with mobility. Short Term Goal 4: Pt will demo SBA for stair negotiation with rail as needed to progress towards PLOF. Long Term Goals  Time Frame for Long Term Goals : NA due to short ELOS    Following session, patient left in safe position with all fall risk precautions in place. Pt in bedside chair following session, all needs and call light in reach, alarm on.

## 2023-09-20 NOTE — PROGRESS NOTES
[facility] [place] OCCUPATIONAL THERAPY  [facility] ORTHOPEDICS 7K  EVALUATION    Time:   Time In: 9659  Time Out: 1400  Timed Code Treatment Minutes: 10 Minutes  Minutes: 25          Date: 2023  Patient Name: Mallorie Page,   Gender: female      MRN: [id number]  : 1945  (69 y.o.)  Referring Practitioner: Dr. Julius Leonard MD  Diagnosis: Unstable R Knee  Additional Pertinent Hx: Pt admitted for above dx. She had R TKR in  and had progressive difficulty with her R knee functioning. Pt is S/P R Knee Total Revision and baker's cyst drainage on 23. Restrictions/Precautions:  Restrictions/Precautions: General Precautions, Fall Risk, Weight Bearing  Right Lower Extremity Weight Bearing: Weight Bearing As Tolerated    Subjective  Chart Reviewed: Yes, Orders, History and Physical, Other (comment) (PT evaluation)  Patient assessed for rehabilitation services?: Yes    Subjective: Pleasant and cooperative  Comments: RN approved session. Pt agreed to do her self care and return to the bed. She had been sitting up in the chair for a couple of hours. Comments / Details: Pt had R knee pain especially while up and walking. Pt had gotten into supine after the trip to the bathroom. Pain: 6/10: RLE pain when up and walking. She reported it was more tolerable after she had returned to supine.     Vitals: Vitals not assessed per clinical judgement, see nursing flowsheet    Social/Functional History:  Lives With: Son  Type of Home: House  Home Layout: Two level, Performs ADL's on one level  Home Access: Stairs to enter with rails  Entrance Stairs - Number of Steps: 4 LATA  Entrance Stairs - Rails: Right  Home Equipment: Valetta Age, rolling, Cane   Bathroom Shower/Tub: Walk-in shower  Bathroom Toilet: Handicap height  Bathroom Accessibility: Accessible  IADL Comments: son assist with cleaning tasks    Receives Help From: Family  ADL Assistance: Independent  Homemaking Assistance: Needs Inpatient ADL T-Scale Score : 42.03  ADL Inpatient CMS 0-100% Score: 38.32    Activity Tolerance:  Patient tolerance of  treatment: Limited by pain   Pt had refused an ice pack. She did have her legs elevated at the end of the session. Assessment:  Assessment: Pt demonstrates mildly decreased ADL & functional mobility independence over PLOF s/p admission with unstable Right Knee. Continued skilled OT services recommended to facilitate improvements in these areas for increasing safety & indep upon returning home with Fawad Islas recommended. Performance deficits / Impairments: Decreased functional mobility , Decreased ADL status, Decreased endurance, Decreased balance, Decreased high-level IADLs  Prognosis: Good  REQUIRES OT FOLLOW-UP: Yes  Decision Making: Medium Complexity    Treatment Initiated: Treatment and education initiated within context of evaluation. Evaluation time included review of current medical information, gathering information related to past medical, social and functional history, completion of standardized testing, formal and informal observation of tasks, assessment of data and development of plan of care and goals. Treatment time included skilled education and facilitation of tasks to increase safety and independence with ADL's for improved functional independence and quality of life. Pt described her prior level of functioning. She was not using any AE. She was doing her own homemaking. She relied on using an electric grocery cart when they were available. Pt agrees to work with OT to get more independent with her self care. Pt was shown a reacher and sock aid. The benefit of using the sock aid for regular tube socks was discussed. Pt was wearing thigh high ALEM hose on her LLE.       Discharge Recommendations:  Continue to assess pending progress, Home with Home health OT    Patient Education:     Patient Education  Education Given To: Patient  Education Provided: Role of Therapy,

## 2023-09-20 NOTE — CARE COORDINATION
DISCHARGE PLANNING EVALUATION  9/20/23, 8:22 AM EDT    Reason for Referral: discharge plan  Mental Status: alert, oriented   Decision Making:  makes own decisions   Family/Social/Home Environment:  Orlin Freire lives at home with her son. Her son is able to assist with meals, housekeeping, can provide some assistance at home. She has a niece who is available to assist at home, starting this weekend. Family can provide transportation. Current Services including food security, transportation and housekeeping:  no current services, no food security, transportation or housekeeping concerns  Current Equipment: has rolling walker   Payment Source: [id number]   Concerns or Barriers to Discharge: plans home if mobility is safe enough for home  Post-acute [facility]) provider list was provided to patient. Patient was informed of their freedom to choose [facility] provider. Discussed and offered to show the patient the relevant [facility] Providers quality and resource use measures on Medicare Compare web site via computer based on patient's goals of care and treatment preferences. Questions regarding selection process were answered. Declined ecf, prefers [facility]    Teach Back Method used with patient regarding care plan and discharge plan  Patient  verbalized understanding of the plan of care and contributed to goal setting. Patient goals, treatment preferences and discharge plan:  spoke with Orlin Freire about discharge plan. She plans to return home with her son and her niece will be staying with her at discharge. Family can provide meals, transportation and housekeeping. Orlin Freire [facility] or outpt PT/OT, does not plan on ecf, [facility] is HH is needed.        Electronically signed by ADELA Santos on 9/20/2023 at 8:22 AM

## 2023-09-21 PROCEDURE — G0378 HOSPITAL OBSERVATION PER HR: HCPCS

## 2023-09-21 PROCEDURE — 97116 GAIT TRAINING THERAPY: CPT

## 2023-09-21 PROCEDURE — 51798 US URINE CAPACITY MEASURE: CPT

## 2023-09-21 PROCEDURE — 2580000003 HC RX 258: Performed by: ORTHOPAEDIC SURGERY

## 2023-09-21 PROCEDURE — 97110 THERAPEUTIC EXERCISES: CPT

## 2023-09-21 PROCEDURE — 6370000000 HC RX 637 (ALT 250 FOR IP): Performed by: ORTHOPAEDIC SURGERY

## 2023-09-21 PROCEDURE — 93010 ELECTROCARDIOGRAM REPORT: CPT | Performed by: NUCLEAR MEDICINE

## 2023-09-21 PROCEDURE — 1200000000 HC SEMI PRIVATE

## 2023-09-21 PROCEDURE — 6360000002 HC RX W HCPCS: Performed by: ORTHOPAEDIC SURGERY

## 2023-09-21 PROCEDURE — 97535 SELF CARE MNGMENT TRAINING: CPT

## 2023-09-21 PROCEDURE — 6370000000 HC RX 637 (ALT 250 FOR IP): Performed by: PHYSICIAN ASSISTANT

## 2023-09-21 RX ORDER — CHLORTHALIDONE 25 MG/1
25 TABLET ORAL DAILY
Qty: 30 TABLET | Refills: 1 | Status: SHIPPED | OUTPATIENT
Start: 2023-09-22

## 2023-09-21 RX ADMIN — METOPROLOL TARTRATE 25 MG: 25 TABLET, FILM COATED ORAL at 09:22

## 2023-09-21 RX ADMIN — TRAZODONE HYDROCHLORIDE 150 MG: 50 TABLET ORAL at 23:04

## 2023-09-21 RX ADMIN — LUBIPROSTONE 24 MCG: 24 CAPSULE ORAL at 17:22

## 2023-09-21 RX ADMIN — ACETAMINOPHEN 650 MG: 325 TABLET ORAL at 06:07

## 2023-09-21 RX ADMIN — HYDROCODONE BITARTRATE AND ACETAMINOPHEN 2 TABLET: 5; 325 TABLET ORAL at 11:02

## 2023-09-21 RX ADMIN — LEVOTHYROXINE SODIUM 25 MCG: 0.03 TABLET ORAL at 06:49

## 2023-09-21 RX ADMIN — SODIUM CHLORIDE: 9 INJECTION, SOLUTION INTRAVENOUS at 09:19

## 2023-09-21 RX ADMIN — RIVAROXABAN 10 MG: 10 TABLET, FILM COATED ORAL at 17:22

## 2023-09-21 RX ADMIN — Medication 2000 MG: at 09:22

## 2023-09-21 RX ADMIN — CHLORTHALIDONE 25 MG: 25 TABLET ORAL at 09:22

## 2023-09-21 RX ADMIN — SODIUM CHLORIDE, PRESERVATIVE FREE 10 ML: 5 INJECTION INTRAVENOUS at 09:22

## 2023-09-21 RX ADMIN — FAMOTIDINE 20 MG: 20 TABLET ORAL at 09:22

## 2023-09-21 RX ADMIN — LUBIPROSTONE 24 MCG: 24 CAPSULE ORAL at 09:23

## 2023-09-21 RX ADMIN — Medication 2000 MG: at 17:30

## 2023-09-21 RX ADMIN — SODIUM CHLORIDE, PRESERVATIVE FREE 10 ML: 5 INJECTION INTRAVENOUS at 21:02

## 2023-09-21 RX ADMIN — Medication 2000 MG: at 01:14

## 2023-09-21 RX ADMIN — HYDROCODONE BITARTRATE AND ACETAMINOPHEN 2 TABLET: 5; 325 TABLET ORAL at 23:05

## 2023-09-21 ASSESSMENT — PAIN DESCRIPTION - DESCRIPTORS
DESCRIPTORS: SHARP

## 2023-09-21 ASSESSMENT — PAIN SCALES - GENERAL
PAINLEVEL_OUTOF10: 3
PAINLEVEL_OUTOF10: 7
PAINLEVEL_OUTOF10: 5
PAINLEVEL_OUTOF10: 6
PAINLEVEL_OUTOF10: 4
PAINLEVEL_OUTOF10: 3

## 2023-09-21 ASSESSMENT — PAIN DESCRIPTION - LOCATION
LOCATION: LEG
LOCATION: LEG
LOCATION: KNEE
LOCATION: HIP;LEG
LOCATION: KNEE

## 2023-09-21 ASSESSMENT — PAIN DESCRIPTION - FREQUENCY: FREQUENCY: CONTINUOUS

## 2023-09-21 ASSESSMENT — PAIN DESCRIPTION - ONSET: ONSET: ON-GOING

## 2023-09-21 ASSESSMENT — PAIN DESCRIPTION - ORIENTATION
ORIENTATION: RIGHT

## 2023-09-21 ASSESSMENT — PAIN DESCRIPTION - PAIN TYPE
TYPE: SURGICAL PAIN
TYPE: SURGICAL PAIN

## 2023-09-21 ASSESSMENT — PAIN - FUNCTIONAL ASSESSMENT
PAIN_FUNCTIONAL_ASSESSMENT: ACTIVITIES ARE NOT PREVENTED

## 2023-09-21 NOTE — PLAN OF CARE
Consulted yesterday by primary service for asymptomatic hypertension. Chlorthalidone was initiated and BP's much improved. EKG without any concerning ischemic signs but does show LAD- recommend echo to be completed outpatient. BMP in 1 week as well to ensure potassium and sodium levels stable. Follow up with PCP. Patient scheduled to be discharged tomorrow.        Hospitalist group signing off- contact us for any further needs

## 2023-09-21 NOTE — CARE COORDINATION
9/21/23, 11:48 AM EDT    DISCHARGE PLANNING EVALUATION    Spoke with patient about discharge plan. PT/OT and suggested 24 assistance with care or skilled care at discharge. Patient is confirming with family that someone can stay with her, as family members may have other commitments not previously known. Patient will have a decision from family this evening. Patient is reluctant to consider ecf, may consider [facility] if necessary.    Will need precert for ecf, if this is patient's plan at discharge

## 2023-09-21 NOTE — CARE COORDINATION
9/21/23, 10:00 AM EDT    DISCHARGE PLANNING EVALUATION    Plans home with son and niece will also stay with her at discharge. Referral completed to [facility], agency will follow at discharge.

## 2023-09-21 NOTE — PROCEDURES
Bladder scan completed at 1850 and results told to [facility]. Scan showed 306 mL in the patients bladder. Last void was unknown.  Christiane Talley CET

## 2023-09-21 NOTE — PROGRESS NOTES
[facility]  INPATIENT PHYSICAL THERAPY  DAILY NOTE  [facility] ORTHOPEDICS 7K - 4N-26/828-F    Time In: 3444  Time Out: 1007  Timed Code Treatment Minutes: 28 Minutes  Minutes: 28          Date: 2023  Patient Name: Gurmeet Fitzgerald,  Gender:  female        MRN: [id number]  : 1945  (69 y.o.)     Referring Practitioner: Maggie Coombs MD  Diagnosis: unstable right knee  Additional Pertinent Hx: Pt is s/p Revision Right Total Knee,Drain Cyst completed by Dr. Dipika Reed on . Prior Level of Function:  Lives With: Son  Type of Home: House  Home Layout: Two level, Performs ADL's on one level  Home Access: Stairs to enter with rails  Entrance Stairs - Number of Steps: 4 LATA  Entrance Stairs - Rails: Right  Home Equipment: Arlyne Croak, rolling, Cane   Bathroom Shower/Tub: Walk-in shower  Bathroom Toilet: Handicap height  Bathroom Accessibility: Accessible    Receives Help From: Family  ADL Assistance: Independent  Homemaking Assistance: Needs assistance  Homemaking Responsibilities: Yes  Ambulation Assistance: Independent  Transfer Assistance: Independent  Active : Yes  IADL Comments: son assist with cleaning tasks  Additional Comments: Pt [place], however is around the most of the day. Pt amb with no AD prior. Restrictions/Precautions:  Restrictions/Precautions: General Precautions, Fall Risk, Weight Bearing  Right Lower Extremity Weight Bearing: Weight Bearing As Tolerated     SUBJECTIVE: Pt in bed upon arrival, and agrees to therapy, RN approved session, Pt A&O X /, Pt pleasant and cooperative, pt reported higher pain levels this morning and stated she received some pain medication this morning. Pt also reported increased bloody drainage from her incision site this morning with bandage not changed recently; informed pt's nurse with pt's nurse addressing the situation.        PAIN: 7/10: right knee    Vitals: Vitals not assessed per clinical judgement, safety with mobility, pt will greatly benefit from continued skilled PT. Activity Tolerance:  Patient tolerance of  treatment: good. Equipment Recommendations:Equipment Needed: No  Discharge Recommendations: Continue to assess pending progress, 24 hour assistance or supervision, Home with Home Health PT (with assistance at home from pt's son if he is able to stay with her), and Patient would benefit from continued PT at discharge  Plan: Current Treatment Recommendations: Strengthening, Balance training, Gait training, Stair training, Functional mobility training, Neuromuscular re-education, Endurance training, Equipment evaluation, education, & procurement, Patient/Caregiver education & training, Transfer training, Safety education & training, Home exercise program, Therapeutic activities  General Plan:  (6x O)    Patient Education  Patient Education: Plan of Care, Precautions/Restrictions, Bed Mobility, Equipment Education, Transfers, Gait, Use of Gait [place] center, Verbal Exercise Instruction, Health Promotion and Wellness Education, Home Safety Education, Activity Pacing, Postural Awareness,  - Patient Verbalized Understanding    Goals:  Patient Goals : \"walk more and move around\"  Short Term Goals  Time Frame for Short Term Goals: by discharge  Short Term Goal 1: Pt will demo IND with bed mobilty tasks with bed flat to return home safely. Short Term Goal 2: Pt will demo sit to/from stand transfers with RW with  Short Term Goal 3: Pt will amb for 80 feet with RW with S to progress with mobility. Short Term Goal 4: Pt will demo SBA for stair negotiation with rail as needed to progress towards PLOF. Long Term Goals  Time Frame for Long Term Goals : NA due to short ELOS    Following session, patient left in safe position with all fall risk precautions in place.

## 2023-09-21 NOTE — PLAN OF CARE
Problem: Discharge Planning  Goal: Discharge to home or other facility with appropriate resources  Outcome: Progressing  Flowsheets (Taken 9/21/2023 1853)  Discharge to home or other facility with appropriate resources: Identify barriers to discharge with patient and caregiver     Problem: Chronic Conditions and Co-morbidities  Goal: Patient's chronic conditions and co-morbidity symptoms are monitored and maintained or improved  Outcome: Progressing  Flowsheets (Taken 9/21/2023 1853)  Care Plan - Patient's Chronic Conditions and Co-Morbidity Symptoms are Monitored and Maintained or Improved: Monitor and assess patient's chronic conditions and comorbid symptoms for stability, deterioration, or improvement     Problem: Pain  Goal: Verbalizes/displays adequate comfort level or baseline comfort level  Outcome: Progressing  Flowsheets (Taken 9/21/2023 1853)  Verbalizes/displays adequate comfort level or baseline comfort level:   Encourage patient to monitor pain and request assistance   Assess pain using appropriate pain scale     Problem: Safety - Adult  Goal: Free from fall injury  Outcome: Progressing  Flowsheets (Taken 9/21/2023 1853)  Free From Fall Injury: Instruct family/caregiver on patient safety     Problem: ABCDS Injury Assessment  Goal: Absence of physical injury  Outcome: Progressing  Flowsheets (Taken 9/21/2023 0105 by Casey Harris RN)  Absence of Physical Injury: Implement safety measures based on patient assessment     Problem: Skin/Tissue Integrity  Goal: Absence of new skin breakdown  Description: 1. Monitor for areas of redness and/or skin breakdown  2. Assess vascular access sites hourly  3. Every 4-6 hours minimum:  Change oxygen saturation probe site  4. Every 4-6 hours:  If on nasal continuous positive airway pressure, respiratory therapy assess nares and determine need for appliance change or resting period. Outcome: Progressing   Care plan reviewed with patient.   Patient verbalized

## 2023-09-21 NOTE — PROGRESS NOTES
[address] [facility]  Occupational Therapy  Daily Note  Time:   Time In: 8357  Time Out: 0806  Timed Code Treatment Minutes: 45 Minutes  Minutes: 38          Date: 2023  Patient Name: Sixto Oshea,   Gender: female      Room: [facility]27/027-A  MRN: [id number]  : 1945  (69 y.o.)  Referring Practitioner: Dr. Kan Wong MD  Diagnosis: Unstable R Knee  Additional Pertinent Hx: Pt admitted for above dx. She had R TKR in  and had progressive difficulty with her R knee functioning. Pt is S/P R Knee Total Revision and baker's cyst drainage on 23. Restrictions/Precautions:  Restrictions/Precautions: General Precautions, Fall Risk, Weight Bearing  Right Lower Extremity Weight Bearing: Weight Bearing As Tolerated     SUBJECTIVE: RN juvenaled OT session. Pt. In room and agreeable to participate. PAIN: 7/10: RLE    Vitals: Vitals not assessed per clinical judgement, see nursing flowsheet    COGNITION: WFL    ADL:   Grooming: Stand By Assistance. Stood at sink to complete oral care  Lower Extremity Dressing: Stand By Assistance, with set-up, with verbal cues , and with increased time for completion. To thread pants onto LB . BALANCE:  Sitting Balance:  Stand By Assistance. Standing Balance: Contact Guard Assistance. /Stand By Assistance    BED MOBILITY:  Supine to Sit: Moderate Assistance, with head of bed raised, with increased time for completion to guide RLE to EOB  Scooting: Stand By Assistance to scoot to EOB    TRANSFERS:  Sit to Stand:  Contact Guard Assistance. Stand to Sit: Contact Guard Assistance. FUNCTIONAL MOBILITY:  Assistive Device: Rolling Walker  Assist Level:  Contact Guard Assistance. Distance: To and from bathroom and within room        ADDITIONAL ACTIVITIES:  Pt. Voices concern unsure if the son will be able to assist at home and maybe going out of town for a bit. Pt.  Education on options available and current recommendation. Provided Pt. With education on LHAE use to don and doff LB clothing incorporating reacher and sock aid. Pt. Provided with purchasing options. Education also provided on recommendation of walker tray for home to assist Pt. With meal prep and transport. AM-[facility] Inpatient Daily Activity Raw Score: 21  AM-PAC Inpatient ADL T-Scale Score : 44.27  ADL Inpatient CMS 0-100% Score: 32.79    ASSESSMENT:     Activity Tolerance:  Patient tolerance of  treatment: Good treatment tolerance      Discharge Recommendations: 24 hour assistance or supervision, Home with assist as needed, Home with assistance of aide, Home with Home Health OT, and transition to out Pt. PT. After few weeks of home health (If family unable to provided 24 hour care would recommend-SNF)  Equipment Recommendations: Equipment Needed: Yes  Other: Reacher or sock aid  Plan: Times Per Week: 6x  Specific Instructions for Next Treatment: Functional mobility; ADLs and adaptations; dynamic standing balance  Current Treatment Recommendations: Balance training, Functional mobility training, Endurance training, Safety education & training, Self-Care / ADL  Additional Comments: Pt will benefit from continued skilled OT services when medically stable and discharged from Acute. HHOT recommended. Patient Education  Patient Education: ADL's, IADL's, Equipment Education, Home Safety, Importance of Increasing Activity, and Assistive Device Safety    Goals  Short Term Goals  Time Frame for Short Term Goals: By discharge  Short Term Goal 1: Pt will complete lower body ADLs while using any AE needed with SBA to increase her independence with self care. Short Term Goal 2: Pt will demonstrate functional mobility walking to/from the bathroom and in the hallway around obstacles with a RW and SBA to prepare for doing self care and light homemaking.   Short Term Goal 3: Pt will complete transfers to/from various surfaces including an elevated toilet seat with armrests and SBA to increase her independence with toileting routine. Short Term Goal 4: Pt will complete dynamic standing activity while using 2 hand release with SBA to increase her balance for ease of doing ADLs and light homemaking. Additional Goals?: No    Following session, patient left in safe position with all fall risk precautions in place.

## 2023-09-22 VITALS
SYSTOLIC BLOOD PRESSURE: 127 MMHG | DIASTOLIC BLOOD PRESSURE: 75 MMHG | OXYGEN SATURATION: 95 % | TEMPERATURE: 97.4 F | WEIGHT: 171 LBS | RESPIRATION RATE: 18 BRPM | HEART RATE: 84 BPM | BODY MASS INDEX: 32.28 KG/M2 | HEIGHT: 61 IN

## 2023-09-22 PROCEDURE — 97110 THERAPEUTIC EXERCISES: CPT

## 2023-09-22 PROCEDURE — 6360000002 HC RX W HCPCS: Performed by: ORTHOPAEDIC SURGERY

## 2023-09-22 PROCEDURE — 6370000000 HC RX 637 (ALT 250 FOR IP): Performed by: ORTHOPAEDIC SURGERY

## 2023-09-22 PROCEDURE — 6370000000 HC RX 637 (ALT 250 FOR IP): Performed by: PHYSICIAN ASSISTANT

## 2023-09-22 PROCEDURE — 97530 THERAPEUTIC ACTIVITIES: CPT

## 2023-09-22 PROCEDURE — 97116 GAIT TRAINING THERAPY: CPT

## 2023-09-22 PROCEDURE — G0378 HOSPITAL OBSERVATION PER HR: HCPCS

## 2023-09-22 PROCEDURE — 2580000003 HC RX 258: Performed by: ORTHOPAEDIC SURGERY

## 2023-09-22 PROCEDURE — 97535 SELF CARE MNGMENT TRAINING: CPT

## 2023-09-22 RX ADMIN — HYDROCODONE BITARTRATE AND ACETAMINOPHEN 2 TABLET: 5; 325 TABLET ORAL at 08:24

## 2023-09-22 RX ADMIN — POLYETHYLENE GLYCOL 3350 17 G: 17 POWDER, FOR SOLUTION ORAL at 08:24

## 2023-09-22 RX ADMIN — ACETAMINOPHEN 650 MG: 325 TABLET ORAL at 05:22

## 2023-09-22 RX ADMIN — LEVOTHYROXINE SODIUM 25 MCG: 0.03 TABLET ORAL at 05:22

## 2023-09-22 RX ADMIN — SODIUM CHLORIDE, PRESERVATIVE FREE 10 ML: 5 INJECTION INTRAVENOUS at 08:24

## 2023-09-22 RX ADMIN — Medication 2000 MG: at 01:03

## 2023-09-22 RX ADMIN — Medication 2000 MG: at 08:46

## 2023-09-22 RX ADMIN — LUBIPROSTONE 24 MCG: 24 CAPSULE ORAL at 08:23

## 2023-09-22 RX ADMIN — METOPROLOL TARTRATE 25 MG: 25 TABLET, FILM COATED ORAL at 08:24

## 2023-09-22 ASSESSMENT — PAIN SCALES - GENERAL
PAINLEVEL_OUTOF10: 5
PAINLEVEL_OUTOF10: 4

## 2023-09-22 ASSESSMENT — PAIN DESCRIPTION - DESCRIPTORS
DESCRIPTORS: ACHING
DESCRIPTORS: SHARP
DESCRIPTORS: ACHING

## 2023-09-22 ASSESSMENT — PAIN DESCRIPTION - LOCATION
LOCATION: LEG;KNEE
LOCATION: LEG

## 2023-09-22 ASSESSMENT — PAIN - FUNCTIONAL ASSESSMENT: PAIN_FUNCTIONAL_ASSESSMENT: ACTIVITIES ARE NOT PREVENTED

## 2023-09-22 ASSESSMENT — PAIN DESCRIPTION - ORIENTATION
ORIENTATION: RIGHT
ORIENTATION: RIGHT

## 2023-09-22 ASSESSMENT — PAIN DESCRIPTION - PAIN TYPE: TYPE: SURGICAL PAIN

## 2023-09-22 NOTE — PROGRESS NOTES
Reviewed discharge instructions. Completed dressing change to R knee and reviewed with patient. Patient verbalized understanding. Patient escorted to front lobby where niece was waiting.

## 2023-09-22 NOTE — PLAN OF CARE
Problem: Discharge Planning  Goal: Discharge to home or other facility with appropriate resources  9/22/2023 0138 by Jesus Louie RN  Outcome: Progressing  Flowsheets (Taken 9/22/2023 0138)  Discharge to home or other facility with appropriate resources:   Identify barriers to discharge with patient and caregiver   Arrange for needed discharge resources and transportation as appropriate   Identify discharge learning needs (meds, wound care, etc)     Problem: Chronic Conditions and Co-morbidities  Goal: Patient's chronic conditions and co-morbidity symptoms are monitored and maintained or improved  9/22/2023 0138 by Jesus Louie RN  Outcome: Progressing  Flowsheets (Taken 9/22/2023 0138)  Care Plan - Patient's Chronic Conditions and Co-Morbidity Symptoms are Monitored and Maintained or Improved:   Monitor and assess patient's chronic conditions and comorbid symptoms for stability, deterioration, or improvement   Collaborate with multidisciplinary team to address chronic and comorbid conditions and prevent exacerbation or deterioration     Problem: Pain  Goal: Verbalizes/displays adequate comfort level or baseline comfort level  9/22/2023 0138 by Jesus Louie RN  Outcome: Progressing  Flowsheets (Taken 9/22/2023 0138)  Verbalizes/displays adequate comfort level or baseline comfort level:   Encourage patient to monitor pain and request assistance   Assess pain using appropriate pain scale   Administer analgesics based on type and severity of pain and evaluate response   Implement non-pharmacological measures as appropriate and evaluate response     Problem: Safety - Adult  Goal: Free from fall injury  9/22/2023 0138 by Jesus Louie RN  Outcome: Progressing  Flowsheets (Taken 9/22/2023 0138)  Free From Fall Injury: Instruct family/caregiver on patient safety     Problem: ABCDS Injury Assessment  Goal: Absence of physical injury  9/22/2023 0138 by Jesus Louie RN  Outcome:

## 2023-09-22 NOTE — CARE COORDINATION
9/22/23, 8:49 AM EDT    DISCHARGE PLANNING EVALUATION    Spoke with patient about discharge plan. She plans home with family support, her niece is able to stay with her at discharge. Confirmed plan with [facility], spoke with Riana Khan at [facility]. [facility] faxed. 9/22/23, 1:17 PM EDT    Patient goals/plan/ treatment preferences discussed by  and . Patient goals/plan/ treatment preferences reviewed with patient/ family. Patient/ family verbalize understanding of discharge plan and are in agreement with goal/plan/treatment preferences. Understanding was demonstrated using the teach back method. AVS provided by RN at time of discharge, which includes all necessary medical information pertaining to the patients current course of illness, treatment, post-discharge goals of care, and treatment preferences.      Services At/After Discharge: Home Health       IMM Letter  IMM Letter given to Patient/Family/Significant other/Guardian/POA/by[de-identified] Loreto ROSENTHAL CM  IMM Letter date given[de-identified] 09/20/23  IMM Letter time given[de-identified] 1942

## 2023-09-22 NOTE — PROGRESS NOTES
Stand: Stand By Assistance, [employer], X 1, cues for hand placement  Stand to Sit:Stand By Assistance, Contact Guard Assistance, X 1    Ambulation:  Contact Guard Assistance, X 1  Distance: 15 feet, 60 feet, 5 feet   Surface: Level Tile  Device:Rolling Walker  Gait Deviations: Forward Flexed Posture, Slow Mabel, Decreased Step Length Bilaterally, Decreased Weight Shift Right, Decreased Gait Speed, Decreased Heel Strike Bilaterally, Narrow Base of Support, Decreased Terminal Knee Extension, and Increased [place] on RW    Stairs:  Contact Guard Assistance, X 1, with verbal cues   Number of Steps: 4  Height: 6\" step with Bilateral Handrails  *conversed with pt about having her son help assist/support her on the left side when ascending her steps at home due to pt only having a right handrail   *pt voiced increased complaints of dizziness/lightheadedness after stair training and required to sit down in wheelchair to rest; after resting dizziness subsided     Balance:  Static Sitting Balance:  Modified Independent, Supervision, X 1  *sitting at edge of recliner with no back support  Dynamic Sitting Balance: Supervision, Stand By Assistance, X 1  *seated on toilet completing functional tasks  Static Standing Balance: Stand By Assistance, Contact Guard Assistance, X 1  *standing at RW prior to gait training   Dynamic Standing Balance: Contact Guard Assistance, X 1  *standing at sink in restroom washing her hands     Exercise:  Patient was guided in 1 set(s) 15 reps of exercise to both lower extremities. Ankle pumps, Glut sets, Quad sets, Heelslides, Short arc quads (AAROM for right), Hip abduction/adduction, Straight leg raises (AAROM for right), and Hooklying hip abduction/adduction. Exercises were completed for increased independence with functional mobility.     Functional Outcome Measures: Completed  AM-PAC Inpatient Mobility Raw Score : 18  AM-PAC Inpatient T-Scale Score : 43.63    ASSESSMENT:  Assessment: Patient progressing toward established goals. Pt demonstrates impairments with strength, balance, endurance, activity tolerance, requires hands on assistance for safety with mobility and would benefit from continued skilled PT improve these impairments, to return to PLOF, to prevent falls and ensure safety with mobility, pt will greatly benefit from continued skilled PT. Activity Tolerance:  Patient tolerance of  treatment: good. Equipment Recommendations:Equipment Needed: No  Discharge Recommendations: Home with Assist as Needed and Home with Home Health PT  Plan: Current Treatment Recommendations: Strengthening, Balance training, Gait training, Stair training, Functional mobility training, Neuromuscular re-education, Endurance training, Equipment evaluation, education, & procurement, Patient/Caregiver education & training, Transfer training, Safety education & training, Home exercise program, Therapeutic activities  General Plan:  (6x O)    Patient Education  Patient Education: Plan of Care, Home Exercise Program, Equipment Education, Transfers, Gait, Stairs, Use of Gait [place] [facility], Verbal Exercise Instruction, Health Promotion and Wellness Education, Home Safety Education, Activity Pacing, Postural Awareness, Propping up right heel with a towel roll to promote right knee extension, Icing right knee for 15-20 minutes to help decrease pain and inflammation,  - Patient Verbalized Understanding    Goals:  Patient Goals : \"walk more and move around\"  Short Term Goals  Time Frame for Short Term Goals: by discharge  Short Term Goal 1: Pt will demo IND with bed mobilty tasks with bed flat to return home safely. Short Term Goal 2: Pt will demo sit to/from stand transfers with RW with  Short Term Goal 3: Pt will amb for 80 feet with RW with S to progress with mobility. Short Term Goal 4: Pt will demo SBA for stair negotiation with rail as needed to progress towards PLOF.   Long Term

## 2023-09-22 NOTE — PROGRESS NOTES
[address] [facility]  Occupational Therapy  Daily Note  Time:   Time In: 9142  Time Out: 7817  Timed Code Treatment Minutes: 23 Minutes  Minutes: 23          Date: 2023  Patient Name: Yoana Mendez,   Gender: female      Room: -27/027-A  MRN: [id number]  : 1945  (69 y.o.)  Referring Practitioner: Dr. Daniela Garza MD  Diagnosis: Unstable R Knee  Additional Pertinent Hx: Pt admitted for above dx. She had R TKR in  and had progressive difficulty with her R knee functioning. Pt is S/P R Knee Total Revision and baker's cyst drainage on 23. Restrictions/Precautions:  Restrictions/Precautions: General Precautions, Fall Risk, Weight Bearing  Right Lower Extremity Weight Bearing: Weight Bearing As Tolerated     SUBJECTIVE: Patient sleeping in bed upon arrival minimal verbal stimulation to alert; agreeable to therapy this date. Patient pleasant and cooperative. Noted red tone to urine, nursing notified end of session. PAIN: 0/10:      Vitals: Vitals not assessed per clinical judgement, see nursing flowsheet    COGNITION: WFL    ADL:   Grooming: Supervision. Standing at sink to brush teeth  Bathing: Stand By Assistance. UB only  Upper Extremity Dressing: Minimal Assistance. Doff/maribell gown  Toileting: Stand By Assistance. Toilet Transfer: Stand By Assistance. Estrella Ramon BALANCE:  Sitting Balance:  Supervision. Standing Balance: Supervision. RW, no LOB    BED MOBILITY:  Supine to Sit: Stand By Assistance, X 1, with head of bed raised, with rail, with increased time for completion      TRANSFERS:  Sit to Stand:  Stand By Assistance, X 1, with increased time for completion, cues for hand placement, with verbal cues. Stand to Sit: Supervision, X 1, with increased time for completion, cues for hand placement, with verbal cues, to/from chair with arms. FUNCTIONAL MOBILITY:  Assistive Device: Rolling Walker  Assist Level:  Supervision. Distance:  To and from

## 2023-09-23 LAB
EKG ATRIAL RATE: 79 BPM
EKG P AXIS: 37 DEGREES
EKG P-R INTERVAL: 142 MS
EKG Q-T INTERVAL: 360 MS
EKG QRS DURATION: 84 MS
EKG QTC CALCULATION (BAZETT): 412 MS
EKG R AXIS: -31 DEGREES
EKG T AXIS: 22 DEGREES
EKG VENTRICULAR RATE: 79 BPM

## 2023-09-24 LAB
BACTERIA SPEC AEROBE CULT: NORMAL
BACTERIA SPEC AEROBE CULT: NORMAL
BACTERIA SPEC ANAEROBE CULT: NORMAL
BACTERIA SPEC ANAEROBE CULT: NORMAL
GRAM STN SPEC: NORMAL
GRAM STN SPEC: NORMAL

## 2023-09-25 LAB
FUNGUS SPEC CULT: NORMAL
FUNGUS SPEC FUNGUS STN: NORMAL

## 2023-09-26 NOTE — DISCHARGE SUMMARY
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    OPERATIONS PERFORMED:  Revision of right total knee replacement except  the patella with drainage of Baker cyst.    COMPLICATIONS:  None. HISTORY OF PRESENT ILLNESS:  The patient is a pleasant 78-year-old  female who has been following for quite some time with right knee pain  and instability. She had done it many years ago. She has had gross  instability as well as obvious rupture of her PCL on x-rays. She had  gone and developed increased pain and problems with this knee, tried  conservative measures including bracing and therapy that did not help. She also developed Baker cyst in back of the knee that has been  bothering her. At this point, the risks and benefits of surgery were  discussed in detail and she wanted to proceed. HOSPITAL COURSE:  The patient was brought to the operating room on  2023, at which time, she underwent right total knee replacement. She had no complications. She was then admitted to general orthopedic  floor for postoperative care where she had an uneventful stay. She was  seen by Physical Therapy where she went on to mobilize well with a  walker. She was able to be discharged in stable condition to home with  home-health. CONDITION ON DISCHARGE:  Good. DISCHARGE INSTRUCTIONS:  The patient is to follow up with Dr. Linda Carcamo  in two weeks.   Weightbear as

## 2023-10-19 PROBLEM — Z98.890 POST-OPERATIVE STATE: Status: RESOLVED | Noted: 2023-09-19 | Resolved: 2023-10-19

## 2023-10-23 LAB
FUNGUS SPEC CULT: NORMAL
FUNGUS SPEC FUNGUS STN: NORMAL

## (undated) DEVICE — STRYKER PERFORMANCE SERIES SAGITTAL BLADE: Brand: STRYKER PERFORMANCE SERIES

## (undated) DEVICE — VORTEX VACUUM MIXING SYSTEM: Brand: VORTEX

## (undated) DEVICE — BASIC SINGLE BASIN BTC-LF: Brand: MEDLINE INDUSTRIES, INC.

## (undated) DEVICE — SPONGE GZ W4XL4IN COT 12 PLY TYP VII WVN C FLD DSGN STERILE

## (undated) DEVICE — BANDAGE COMPR E SGL LAYERED CLSR BGE W/ CLP W4INXL15FT

## (undated) DEVICE — SUTURE STRATAFIX SPRL SZ 1 L14IN ABSRB VLT L48CM CTX 1/2 SXPD2B405

## (undated) DEVICE — CLOSURE SKIN FLX NONINVASIVE PRELOC TECHNOLOGY FOR 24IN

## (undated) DEVICE — DUAL CUT SAGITTAL BLADE

## (undated) DEVICE — GENESIS TROCHLEAR PIN 1/8 IN. X 5IN: Brand: GENESIS

## (undated) DEVICE — GLOVE ORANGE PI 7   MSG9070

## (undated) DEVICE — GLOVE ORANGE PI 8   MSG9080

## (undated) DEVICE — 35 ML SYRINGE LUER-LOCK TIP: Brand: MONOJECT

## (undated) DEVICE — SOLUTION IRRIG 3000ML 0.9% SOD CHL USP UROMATIC PLAS CONT

## (undated) DEVICE — BANDAGE COMPR W6INXL5YD WHT BGE POLY COT M E WRP WV HK AND

## (undated) DEVICE — PACK-MAJOR

## (undated) DEVICE — Device

## (undated) DEVICE — PATTERSON TOTAL JOINT: Brand: MEDLINE INDUSTRIES, INC.

## (undated) DEVICE — CONTAINER,SPECIMEN,PNEU TUBE,4OZ,OR STRL: Brand: MEDLINE

## (undated) DEVICE — GENESIS TROCHLEAR PIN 1/8 X 3
Type: IMPLANTABLE DEVICE | Site: KNEE | Status: NON-FUNCTIONAL
Brand: GENESIS
Removed: 2023-09-19

## (undated) DEVICE — PADDING CAST W6INXL4YD COT LO LINTING WYTEX

## (undated) DEVICE — SUTURE VCRL + SZ 2-0 L27IN ABSRB UD CP-1 1/2 CIR REV CUT VCP266H

## (undated) DEVICE — BANDAGE COMPR W6INXL12FT SMOOTH FOR LIMB EXSANG ESMARCH